# Patient Record
Sex: FEMALE | Race: OTHER | HISPANIC OR LATINO | Employment: UNEMPLOYED | URBAN - METROPOLITAN AREA
[De-identification: names, ages, dates, MRNs, and addresses within clinical notes are randomized per-mention and may not be internally consistent; named-entity substitution may affect disease eponyms.]

---

## 2021-08-22 ENCOUNTER — HOSPITAL ENCOUNTER (EMERGENCY)
Facility: HOSPITAL | Age: 31
Discharge: HOME | End: 2021-08-22
Attending: EMERGENCY MEDICINE | Admitting: EMERGENCY MEDICINE

## 2021-08-22 ENCOUNTER — APPOINTMENT (EMERGENCY)
Dept: RADIOLOGY | Facility: HOSPITAL | Age: 31
End: 2021-08-22

## 2021-08-22 ENCOUNTER — APPOINTMENT (EMERGENCY)
Dept: CT IMAGING | Facility: HOSPITAL | Age: 31
End: 2021-08-22

## 2021-08-22 VITALS
HEART RATE: 99 BPM | SYSTOLIC BLOOD PRESSURE: 117 MMHG | RESPIRATION RATE: 18 BRPM | OXYGEN SATURATION: 97 % | TEMPERATURE: 98.2 F | DIASTOLIC BLOOD PRESSURE: 60 MMHG

## 2021-08-22 DIAGNOSIS — S09.90XA INJURY OF HEAD, INITIAL ENCOUNTER: ICD-10-CM

## 2021-08-22 DIAGNOSIS — S50.11XA CONTUSION OF RIGHT FOREARM, INITIAL ENCOUNTER: ICD-10-CM
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
## 2021-08-22 DIAGNOSIS — S00.93XA HEAD CONTUSION: ICD-10-CM

## 2021-08-22 LAB
EXT PREG TEST URINE: NEGATIVE
EXT. CONTROL ED NAV: NORMAL

## 2021-08-22 PROCEDURE — 99285 EMERGENCY DEPT VISIT HI MDM: CPT | Performed by: EMERGENCY MEDICINE

## 2021-08-22 PROCEDURE — 81025 URINE PREGNANCY TEST: CPT

## 2021-08-22 PROCEDURE — 73090 X-RAY EXAM OF FOREARM: CPT

## 2021-08-22 PROCEDURE — G1004 CDSM NDSC: HCPCS

## 2021-08-22 PROCEDURE — 99284 EMERGENCY DEPT VISIT MOD MDM: CPT

## 2021-08-22 PROCEDURE — 70450 CT HEAD/BRAIN W/O DYE: CPT

## 2021-08-22 RX ORDER — IBUPROFEN 600 MG/1
600 TABLET ORAL ONCE
Status: COMPLETED | OUTPATIENT
Start: 2021-08-22 | End: 2021-08-22

## 2021-08-22 RX ADMIN — IBUPROFEN 600 MG: 600 TABLET ORAL at 02:26
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
## 2022-02-10 ENCOUNTER — TELEPHONE (OUTPATIENT)
Dept: OBGYN CLINIC | Facility: CLINIC | Age: 32
End: 2022-02-10

## 2022-04-05 ENCOUNTER — OFFICE VISIT (OUTPATIENT)
Dept: OBGYN CLINIC | Facility: CLINIC | Age: 32
End: 2022-04-05

## 2022-04-05 ENCOUNTER — APPOINTMENT (OUTPATIENT)
Dept: LAB | Facility: CLINIC | Age: 32
End: 2022-04-05

## 2022-04-05 VITALS — DIASTOLIC BLOOD PRESSURE: 67 MMHG | HEART RATE: 87 BPM | WEIGHT: 164 LBS | SYSTOLIC BLOOD PRESSURE: 103 MMHG

## 2022-04-05 DIAGNOSIS — O20.9 BLEEDING IN EARLY PREGNANCY: ICD-10-CM

## 2022-04-05 DIAGNOSIS — O20.9 BLEEDING IN EARLY PREGNANCY: Primary | ICD-10-CM

## 2022-04-05 LAB
ABO GROUP BLD: NORMAL
B-HCG SERPL-ACNC: 169 MIU/ML
BLD GP AB SCN SERPL QL: NEGATIVE
RH BLD: POSITIVE
SPECIMEN EXPIRATION DATE: NORMAL

## 2022-04-05 PROCEDURE — 99202 OFFICE O/P NEW SF 15 MIN: CPT | Performed by: OBSTETRICS & GYNECOLOGY

## 2022-04-05 PROCEDURE — 36415 COLL VENOUS BLD VENIPUNCTURE: CPT

## 2022-04-05 PROCEDURE — 86900 BLOOD TYPING SEROLOGIC ABO: CPT

## 2022-04-05 PROCEDURE — 86901 BLOOD TYPING SEROLOGIC RH(D): CPT

## 2022-04-05 PROCEDURE — 84702 CHORIONIC GONADOTROPIN TEST: CPT

## 2022-04-05 PROCEDURE — 86850 RBC ANTIBODY SCREEN: CPT

## 2022-04-05 RX ORDER — MONTELUKAST SODIUM 10 MG/1
10 TABLET ORAL
COMMUNITY

## 2022-04-05 NOTE — PROGRESS NOTES
Assessment/Plan:     No problem-specific Assessment & Plan notes found for this encounter  Diagnoses and all orders for this visit:    Bleeding in early pregnancy  -     hCG, quantitative; Future  -     Type and screen; Future      RTO in 2 weeks for follow up  Subjective:      Patient ID: Priscilla Medina is a 27 y o  female presents with bleeding with a positive pregnancy test   Her LMP was 03/12/22  She took plan B x one dose but she does not remember the date  She has been bleeding and passing clots  She does not desire pregnancy  She wishes to start on birth control once this episode is resolved  HPI    The following portions of the patient's history were reviewed and updated as appropriate: allergies, current medications, past family history, past medical history, past social history, past surgical history and problem list     Review of Systems      Objective:      /67   Pulse 87   Wt 74 4 kg (164 lb)   LMP 03/12/2022 (Exact Date)          Physical Exam  Vitals and nursing note reviewed  Constitutional:       Appearance: Normal appearance  Pulmonary:      Effort: Pulmonary effort is normal    Neurological:      General: No focal deficit present  Mental Status: She is alert and oriented to person, place, and time     Psychiatric:         Mood and Affect: Mood normal          Behavior: Behavior normal

## 2022-04-08 ENCOUNTER — TELEPHONE (OUTPATIENT)
Dept: OBGYN CLINIC | Facility: CLINIC | Age: 32
End: 2022-04-08

## 2022-05-02 ENCOUNTER — OFFICE VISIT (OUTPATIENT)
Dept: OBGYN CLINIC | Facility: CLINIC | Age: 32
End: 2022-05-02

## 2022-05-02 VITALS — HEART RATE: 87 BPM | DIASTOLIC BLOOD PRESSURE: 70 MMHG | SYSTOLIC BLOOD PRESSURE: 109 MMHG | WEIGHT: 164.2 LBS

## 2022-05-02 DIAGNOSIS — O20.9 BLEEDING IN EARLY PREGNANCY: Primary | ICD-10-CM

## 2022-05-02 DIAGNOSIS — N89.8 VAGINAL ODOR: ICD-10-CM

## 2022-05-02 PROCEDURE — 99213 OFFICE O/P EST LOW 20 MIN: CPT | Performed by: OBSTETRICS & GYNECOLOGY

## 2022-05-02 RX ORDER — METRONIDAZOLE 500 MG/1
500 TABLET ORAL EVERY 12 HOURS SCHEDULED
Qty: 14 TABLET | Refills: 0 | Status: SHIPPED | OUTPATIENT
Start: 2022-05-02 | End: 2022-05-09

## 2022-05-02 NOTE — PROGRESS NOTES
Assessment/Plan:     No problem-specific Assessment & Plan notes found for this encounter  Diagnoses and all orders for this visit:    Bleeding in early pregnancy    Vaginal odor  -     metroNIDAZOLE (FLAGYL) 500 mg tablet; Take 1 tablet (500 mg total) by mouth every 12 (twelve) hours for 7 days      Will call with her HCG and plan on birth control  Subjective: [facility] #[id number]     Patient ID: Bryson Molina is a 27 y o  female who presents as a follow up to probably miscarriage  Her quant on 04/05/22 was 169  She is now using a panty liner with small clots  She was to go for folllow up hcg but somehow was lost to follow up  Prior to this she was using 3-4 pads a day  She is also reporting a foul vaginal odor  HPI       The following portions of the patient's history were reviewed and updated as appropriate: allergies, current medications, past family history, past medical history, past social history, past surgical history and problem list     Review of Systems      Objective:      /70   Pulse 87   Wt 74 5 kg (164 lb 3 2 oz)   LMP 03/12/2022 (Exact Date)          Physical Exam  Vitals and nursing note reviewed  Constitutional:       Appearance: Normal appearance  Pulmonary:      Effort: Pulmonary effort is normal    Neurological:      General: No focal deficit present  Mental Status: She is alert and oriented to person, place, and time     Psychiatric:         Mood and Affect: Mood normal          Behavior: Behavior normal

## 2022-05-05 ENCOUNTER — APPOINTMENT (OUTPATIENT)
Dept: LAB | Facility: CLINIC | Age: 32
End: 2022-05-05

## 2022-05-05 DIAGNOSIS — O20.9 BLEEDING IN EARLY PREGNANCY: ICD-10-CM

## 2022-05-05 LAB — B-HCG SERPL-ACNC: 4 MIU/ML

## 2022-05-05 PROCEDURE — 84702 CHORIONIC GONADOTROPIN TEST: CPT

## 2022-05-05 PROCEDURE — 36415 COLL VENOUS BLD VENIPUNCTURE: CPT

## 2022-05-09 ENCOUNTER — TELEPHONE (OUTPATIENT)
Dept: OBGYN CLINIC | Facility: CLINIC | Age: 32
End: 2022-05-09

## 2022-05-09 NOTE — TELEPHONE ENCOUNTER
----- Message from Anh Curtis MD sent at 5/6/2022  8:47 AM EDT -----  Please inform HCG is negative  Would she like to start OCPs? I can send if yes  She will then need pill check  TY  5/9/2022 Patient notified of negative HCG results and reports that she wants  OCP and asked when she should start taking pills

## 2022-05-10 ENCOUNTER — TELEPHONE (OUTPATIENT)
Dept: OBGYN CLINIC | Facility: CLINIC | Age: 32
End: 2022-05-10

## 2022-05-10 NOTE — TELEPHONE ENCOUNTER
----- Message from Samantha Pollack MD sent at 5/9/2022 12:47 PM EDT -----  Rx sent to [facility] on [address]  Will need 3 month pill check  TY  ----- Message -----  From: Macy Lamar RN  Sent: 5/9/2022   9:21 AM EDT  To: Samantha Pollack MD    Yes patient wants to start OCP   ----- Message -----  From: Samantha Pollack MD  Sent: 5/6/2022   8:48 AM EDT  To: [address]    Please inform HCG is negative  Would she like to start OCPs? I can send if yes  She will then need pill check      TY

## 2022-05-10 NOTE — TELEPHONE ENCOUNTER
Called patient to inform her that BCP sent to pharmacy and that she needs 3 month follow up  LVM asking patient to call office

## 2022-06-03 ENCOUNTER — TELEPHONE (OUTPATIENT)
Dept: OBGYN CLINIC | Facility: CLINIC | Age: 32
End: 2022-06-03

## 2022-07-21 ENCOUNTER — TELEPHONE (OUTPATIENT)
Dept: OBGYN CLINIC | Facility: CLINIC | Age: 32
End: 2022-07-21

## 2022-07-26 ENCOUNTER — OFFICE VISIT (OUTPATIENT)
Dept: OBGYN CLINIC | Facility: CLINIC | Age: 32
End: 2022-07-26

## 2022-07-26 VITALS
HEIGHT: 65 IN | HEART RATE: 98 BPM | BODY MASS INDEX: 27.19 KG/M2 | SYSTOLIC BLOOD PRESSURE: 120 MMHG | WEIGHT: 163.2 LBS | DIASTOLIC BLOOD PRESSURE: 76 MMHG

## 2022-07-26 DIAGNOSIS — N76.0 BV (BACTERIAL VAGINOSIS): Primary | ICD-10-CM

## 2022-07-26 DIAGNOSIS — Z30.09 ENCOUNTER FOR COUNSELING REGARDING CONTRACEPTION: ICD-10-CM

## 2022-07-26 DIAGNOSIS — B96.89 BV (BACTERIAL VAGINOSIS): Primary | ICD-10-CM

## 2022-07-26 LAB
BV WHIFF TEST VAG QL: ABNORMAL
CLUE CELLS SPEC QL WET PREP: POSITIVE
PH SMN: 5 [PH]
SL AMB POCT WET MOUNT: POSITIVE
T VAGINALIS VAG QL WET PREP: NEGATIVE
YEAST VAG QL WET PREP: NEGATIVE

## 2022-07-26 PROCEDURE — 87210 SMEAR WET MOUNT SALINE/INK: CPT | Performed by: OBSTETRICS & GYNECOLOGY

## 2022-07-26 PROCEDURE — 99213 OFFICE O/P EST LOW 20 MIN: CPT | Performed by: OBSTETRICS & GYNECOLOGY

## 2022-07-26 RX ORDER — METRONIDAZOLE 500 MG/1
500 TABLET ORAL EVERY 12 HOURS SCHEDULED
Qty: 14 TABLET | Refills: 0 | Status: SHIPPED | OUTPATIENT
Start: 2022-07-26 | End: 2022-08-02

## 2022-07-26 NOTE — PATIENT INSTRUCTIONS
Vaginosis bacteriana   LO QUE NECESITA SABER:   La vaginosis bacteriana es laurence infección en la vagina  Puede causar vaginitis (irritación e inflamación de la vagina)  La causa es desconocida  Las bacterias que normalmente se encuentran en la vagina están desequilibradas  El riesgo aumenta si usted es sexualmente Dobrovo v Brdi[place], [place] ducha vaginal o tiene un dispositivo intrauterino (DIU)  INSTRUCCIONES SOBRE EL [facility]:   Llame a nguyễn médico o ginecólogo si:  Noemi síntomas regresan o no mejoran con el tratamiento  Usted tiene sangrado vaginal que no es de nguyễn menstruación  Usted tiene preguntas o inquietudes acerca de nguyễn condición o cuidado  Medicamentos:  Los antibióticos pueden administrarse para matar las bacterias  Se los pueden recetar camila laurence píldora o crema para introducir en la vagina  [place] noemi medicamentos camila se le haya indicado  Consulte con nguyễn médico si usted ashutosh que nguyễn medicamento no le está ayudando o si presenta efectos secundarios  Infórmele si es alérgico a algún medicamento  Mantenga laurence lista actualizada de los [place], las vitaminas y los productos herbales que vamsi  Incluya los siguientes datos de los medicamentos: cantidad, frecuencia y motivo de administración  Traiga con usted la lista o los envases de las píldoras a noemi citas de seguimiento  Lleve la lista de los medicamentos con usted en layla de laurence emergencia  Vaginosis bacteriana y embarazo: Si tiene vaginosis bacteriana ezequiel el [facility], es posible que nguyễn bebé nazca antes de tiempo o que tenga un peso bajo al nacer  Es posible que nguyễn proveedor de atención médica le recomiende que se marlen un análisis para detectar la vaginosis bacteriana antes o ezequiel el [facility] sobre el riesgo de un parto prematuro y se asegurará de que conozca los beneficios y riesgos de las pruebas  Prevenir la vaginosis bacteriana: Mantenga el área vaginal limpia y [place]   Lleve ropa interior y pantimedias hechas de algodón en el área de la entrepierna  Límpiese de adelante hacia atrás después de orinar o de tener laurence evacuación intestinal  Después de bañarse, enjuague el jabón de la yessica vaginal para disminuir el riesgo de irritación  No utilice productos que provocan irritación  Siempre use tampones y toallas sanitarias sin aroma  No use aerosoles para higiene femenina, talcos o tampones perfumados  También podrían provocar Irritación y aumentar nguyễn riesgo de vaginosis  Los detergentes y suavizantes para la ropa también pueden causar irritación  No use duchas vaginales  Estas pueden provocar un desequilibrio de la bacteria saludable de la vagina  Use condones de látex ezequiel las [employer]  Estos ayudan a prevenir contra otras infecciones y alessandro que nguyễn adele contraiga la infección  Acuda a kendall consultas de control con nguyễn médico o ginecólogo según le indicaron: La vaginosis bacteriana aumenta el riesgo de varios problemas de [place], camila la enfermedad inflamatoria pélvica (EIP) o las infecciones de transmisión sexual  Natasha Hacking con kendall médicos para programar citas regulares para detectar problemas de viji  Anote kendall preguntas para que se acuerde de hacerlas ezequiel kendall visitas  © Copyright [employer] 2022 Information is for End User's use only and may not be sold, redistributed or otherwise used for commercial purposes  All illustrations and images included in CareNotes® are the copyrighted property of [employer] , [facility]  or [address] [place] [address] es sólo para uso en educación  Nguyễn intención no es darle un consejo médico sobre enfermedades o tratamientos  Colsulte con nguyễn Charna Heckler farmacéutico antes de seguir cualquier régimen médico para saber si es seguro y efectivo para usted

## 2022-07-26 NOTE — PROGRESS NOTES
Assessment/Plan:     No problem-specific Assessment & Plan notes found for this encounter  Diagnoses and all orders for this visit:    BV (bacterial vaginosis)  -     metroNIDAZOLE (FLAGYL) 500 mg tablet; Take 1 tablet (500 mg total) by mouth every 12 (twelve) hours for 7 days  -     POCT wet mount    Encounter for counseling regarding contraception    RTO for IUD/ follow up  Subjective:      Patient ID: Toan Garvey is a 27 y o  female presents for birth control and foul odor  She had an  on 20  She does not want to resume pills as she became pregnant on them  She is interested in an IUD but has no insurance  We gave her information about the [facility] program   Written information was provided on Mirena and Paragard  She states she has had a month long vaginal odor  She has had on and off bleeding since her   HPI    The following portions of the patient's history were reviewed and updated as appropriate: allergies, current medications, past family history, past medical history, past social history, past surgical history and problem list     Review of Systems      Objective:      /76   Pulse 98   Ht 5' 5" (1 651 m)   Wt 74 kg (163 lb 3 2 oz)   LMP 2022 (Exact Date)   BMI 27 16 kg/m²          Physical Exam  Vitals and nursing note reviewed  Exam conducted with a chaperone present  Constitutional:       Appearance: Normal appearance  Pulmonary:      Effort: Pulmonary effort is normal    Genitourinary:     Labia:         Right: No rash, tenderness, lesion or injury  Left: No rash, tenderness, lesion or injury  Vagina: No signs of injury and foreign body  Bleeding present  No vaginal discharge, erythema, tenderness, lesions or prolapsed vaginal walls  Neurological:      General: No focal deficit present  Mental Status: She is alert and oriented to person, place, and time     Psychiatric:         Mood and Affect: Mood normal          Behavior: Behavior normal

## 2022-08-17 ENCOUNTER — TELEPHONE (OUTPATIENT)
Dept: OBGYN CLINIC | Facility: CLINIC | Age: 32
End: 2022-08-17

## 2022-08-23 ENCOUNTER — TELEPHONE (OUTPATIENT)
Dept: OBGYN CLINIC | Facility: CLINIC | Age: 32
End: 2022-08-23

## 2023-06-07 ENCOUNTER — OFFICE VISIT (OUTPATIENT)
Dept: OBGYN CLINIC | Facility: CLINIC | Age: 33
End: 2023-06-07

## 2023-06-07 VITALS
DIASTOLIC BLOOD PRESSURE: 73 MMHG | WEIGHT: 168.6 LBS | SYSTOLIC BLOOD PRESSURE: 108 MMHG | BODY MASS INDEX: 28.06 KG/M2 | HEART RATE: 66 BPM

## 2023-06-07 DIAGNOSIS — Z31.9 PATIENT DESIRES PREGNANCY: Primary | ICD-10-CM

## 2023-06-07 PROCEDURE — 99213 OFFICE O/P EST LOW 20 MIN: CPT | Performed by: NURSE PRACTITIONER

## 2023-06-07 RX ORDER — ALBUTEROL SULFATE 90 UG/1
2 AEROSOL, METERED RESPIRATORY (INHALATION) EVERY 6 HOURS PRN
COMMUNITY

## 2023-06-07 NOTE — PATIENT INSTRUCTIONS
Merissa por nguyễn confianza en nuestro equipo  Pama Rias y agradecemos kendall comentarios  Si recibe laurence encuesta nuestra, tómese unos momentos para informarnos cómo estamos     Sinceramente,  [id number]

## 2023-06-07 NOTE — PROGRESS NOTES
PROBLEM GYNECOLOGICAL VISIT    Juan Manuel Mary is a 26 y o  female who presents today with complaint of infertility  Her general medical history has been reviewed and she reports it as follows:    Past Medical History:   Diagnosis Date   • Asthma      Past Surgical History:   Procedure Laterality Date   •  SECTION  ,   • LAPAROSCOPIC CHOLECYSTECTOMY  2018   • ORIF ANKLE FRACTURE Left 2016     OB History        3    Para   2    Term   2       0    AB   1    Living   2       SAB   1    IAB   0    Ectopic   0    Multiple   0    Live Births   2               Social History     Tobacco Use   • Smoking status: Never   • Smokeless tobacco: Never   Vaping Use   • Vaping Use: Never used   Substance Use Topics   • Alcohol use: Yes     Comment: couple times/year   • Drug use: Never     Social History     Substance and Sexual Activity   Sexual Activity Yes   • Partners: Male   • Birth control/protection: None       Current Outpatient Medications   Medication Instructions   • albuterol (PROVENTIL HFA,VENTOLIN HFA) 90 mcg/act inhaler 2 puffs, Inhalation, Every 6 hours PRN   • montelukast (SINGULAIR) 10 mg, Oral, Daily at bedtime       History of Present Illness:   Reports that she has a new sexual partner for the past 6 months and they desire to conceive  They have been unsuccessful so far  Her partner has never had a child previously  Patient reports that her menses are very regular  She has never had difficulty conceiving previously  Review of Systems:  Review of Systems   Constitutional: Negative  Gastrointestinal: Negative  Genitourinary: Negative  Negative for menstrual problem  Physical Exam:  /73   Pulse 66   Wt 76 5 kg (168 lb 9 6 oz)   LMP 2023 (Exact Date)   BMI 28 06 kg/m²   Physical Exam  Constitutional:       General: She is not in acute distress  Neurological:      Mental Status: She is alert  Skin:     General: Skin is warm and dry  Vitals reviewed  Assessment:   1  Infertility  Plan:   1  Order placed for referral to/consultation with [facility]  2  Return to office in 2 months for annual GYN exam    3  Patient's depression screening was assessed with a PHQ-2 score of 1  Their PHQ-9 score was 4  Clinically patient does not have depression  No treatment is required

## 2023-08-08 ENCOUNTER — ANNUAL EXAM (OUTPATIENT)
Dept: OBGYN CLINIC | Facility: CLINIC | Age: 33
End: 2023-08-08

## 2023-08-08 VITALS
SYSTOLIC BLOOD PRESSURE: 109 MMHG | BODY MASS INDEX: 26.96 KG/M2 | WEIGHT: 162 LBS | HEART RATE: 71 BPM | DIASTOLIC BLOOD PRESSURE: 57 MMHG

## 2023-08-08 DIAGNOSIS — B37.31 VAGINAL CANDIDIASIS: ICD-10-CM

## 2023-08-08 DIAGNOSIS — Z12.4 SCREENING FOR CERVICAL CANCER: ICD-10-CM

## 2023-08-08 DIAGNOSIS — Z23 NEED FOR HPV VACCINATION: ICD-10-CM

## 2023-08-08 DIAGNOSIS — Z12.39 ENCOUNTER FOR BREAST CANCER SCREENING USING NON-MAMMOGRAM MODALITY: ICD-10-CM

## 2023-08-08 DIAGNOSIS — Z01.419 ENCOUNTER FOR GYNECOLOGICAL EXAMINATION WITHOUT ABNORMAL FINDING: Primary | ICD-10-CM

## 2023-08-08 DIAGNOSIS — Z11.3 SCREEN FOR STD (SEXUALLY TRANSMITTED DISEASE): ICD-10-CM

## 2023-08-08 DIAGNOSIS — Z11.51 SCREENING FOR HPV (HUMAN PAPILLOMAVIRUS): ICD-10-CM

## 2023-08-08 DIAGNOSIS — R35.0 URINARY FREQUENCY: ICD-10-CM

## 2023-08-08 LAB
SL AMB  POCT GLUCOSE, UA: ABNORMAL
SL AMB LEUKOCYTE ESTERASE,UA: ABNORMAL
SL AMB POCT BILIRUBIN,UA: ABNORMAL
SL AMB POCT BLOOD,UA: ABNORMAL
SL AMB POCT CLARITY,UA: CLEAR
SL AMB POCT COLOR,UA: ABNORMAL
SL AMB POCT KETONES,UA: ABNORMAL
SL AMB POCT NITRITE,UA: ABNORMAL
SL AMB POCT PH,UA: 5
SL AMB POCT SPECIFIC GRAVITY,UA: 1.03
SL AMB POCT URINE PROTEIN: ABNORMAL
SL AMB POCT UROBILINOGEN: 0.2

## 2023-08-08 PROCEDURE — 87086 URINE CULTURE/COLONY COUNT: CPT | Performed by: NURSE PRACTITIONER

## 2023-08-08 PROCEDURE — 87186 SC STD MICRODIL/AGAR DIL: CPT | Performed by: NURSE PRACTITIONER

## 2023-08-08 PROCEDURE — 99395 PREV VISIT EST AGE 18-39: CPT | Performed by: NURSE PRACTITIONER

## 2023-08-08 PROCEDURE — 81002 URINALYSIS NONAUTO W/O SCOPE: CPT | Performed by: NURSE PRACTITIONER

## 2023-08-08 PROCEDURE — 90651 9VHPV VACCINE 2/3 DOSE IM: CPT | Performed by: NURSE PRACTITIONER

## 2023-08-08 PROCEDURE — 87077 CULTURE AEROBIC IDENTIFY: CPT | Performed by: NURSE PRACTITIONER

## 2023-08-08 PROCEDURE — G0476 HPV COMBO ASSAY CA SCREEN: HCPCS | Performed by: NURSE PRACTITIONER

## 2023-08-08 PROCEDURE — 87491 CHLMYD TRACH DNA AMP PROBE: CPT | Performed by: NURSE PRACTITIONER

## 2023-08-08 PROCEDURE — 90471 IMMUNIZATION ADMIN: CPT | Performed by: NURSE PRACTITIONER

## 2023-08-08 PROCEDURE — 87591 N.GONORRHOEAE DNA AMP PROB: CPT | Performed by: NURSE PRACTITIONER

## 2023-08-08 RX ORDER — FLUCONAZOLE 150 MG/1
150 TABLET ORAL ONCE
Qty: 1 TABLET | Refills: 0 | Status: SHIPPED | OUTPATIENT
Start: 2023-08-08 | End: 2023-08-08

## 2023-08-08 NOTE — PROGRESS NOTES
[address] [place] [address]    Mignon Adams is a 26 y.o. female who presents today for annual GYN exam.  Her last pap smear was performed 5 years ago and result was WNL per patient. She reports no history of abnormal pap smears in her past.  She reports menses as regular. Patient's last menstrual period was 2023 (approximate). Her general medical history has been reviewed and she reports it as follows:    Past Medical History:   Diagnosis Date   • Asthma      Past Surgical History:   Procedure Laterality Date   •  SECTION  ,   • LAPAROSCOPIC CHOLECYSTECTOMY     • ORIF ANKLE FRACTURE Left 2016     OB History        3    Para   2    Term   2       0    AB   1    Living   2       SAB   1    IAB   0    Ectopic   0    Multiple   0    Live Births   2               Social History     Tobacco Use   • Smoking status: Never   • Smokeless tobacco: Never   Vaping Use   • Vaping Use: Never used   Substance Use Topics   • Alcohol use: Yes     Comment: couple times/year   • Drug use: Never     Social History     Substance and Sexual Activity   Sexual Activity Yes   • Partners: Male   • Birth control/protection: None     Cancer-related family history includes Cancer in her maternal grandfather and paternal grandmother. There is no history of Breast cancer, Colon cancer, or Ovarian cancer. Current Outpatient Medications   Medication Instructions   • albuterol (PROVENTIL HFA,VENTOLIN HFA) 90 mcg/act inhaler 2 puffs, Inhalation, Every 6 hours PRN   • montelukast (SINGULAIR) 10 mg, Oral, Daily at bedtime       Review of Systems:  Review of Systems   Constitutional: Negative. Gastrointestinal: Negative. Genitourinary: Positive for frequency. Negative for difficulty urinating, menstrual problem, pelvic pain and vaginal discharge. Vaginal itching   Skin: Negative.         Physical Exam:  /57   Pulse 71   Wt 73.5 kg (162 lb)   LMP 2023 (Approximate) BMI 26.96 kg/m²   Physical Exam  Constitutional:       General: She is not in acute distress. Appearance: She is well-developed. Genitourinary:      Vulva normal.      No lesions in the vagina. Vaginal discharge and erythema present. Right Adnexa: not tender and no mass present. Left Adnexa: not tender and no mass present. No cervical motion tenderness or lesion. Uterus is not tender. Breasts:     Right: No mass, nipple discharge, skin change or tenderness. Left: No mass, nipple discharge, skin change or tenderness. Neck:      Thyroid: No thyromegaly. Cardiovascular:      Rate and Rhythm: Normal rate and regular rhythm. Pulmonary:      Effort: Pulmonary effort is normal.   Abdominal:      Palpations: Abdomen is soft. Tenderness: There is no abdominal tenderness. Musculoskeletal:      Cervical back: Neck supple. Neurological:      Mental Status: She is alert and oriented to person, place, and time. Skin:     General: Skin is warm and dry. Vitals reviewed. Point of Care Testing:   -urine dipstick: + leuks, neg nitrites, + blood    Assessment/Plan:   1. Normal well-woman GYN exam.  2. Cervical cancer screening:  Normal cervical exam.  Pap smear done with HPV co-testing. Has not received HPV vaccine in the past, but she desires to initiate series now. Given HPV vaccine today and will return in 2 and 6 months for subsequent vaccinations. 3. STD screening:  Orders placed for vaginal GC/CT cultures. Orders placed for serum anti-HIV, anti-HCV, HbsAg, syphilis panel. 4. Breast cancer screening:  Normal breast exam.  Reviewed breast self-awareness. 5. Depression Screening: Patient's depression screening was assessed with a PHQ-2 score of 0. Their PHQ-9 score was 0. Clinically patient does not have depression. No treatment is required. 6. BMI Counseling: Body mass index is 26.96 kg/m². No intervention indicated. 7. Contraception:  Declines.    8. Urinary frequency:  Order placed for urine culture. 9. Vaginal candidiasis:  Given Rx Diflucan. 10. Return to office in 1 year for annual GYN exam.    Reviewed with patient that test results are available in [address] [place] [address] immediately, but that they will not necessarily be reviewed by me immediately. Explained that I will review results at my earliest opportunity and contact patient appropriately.

## 2023-08-08 NOTE — LETTER
8/10/2023    To Jluis Barnes  : 1990      Esta carta es para informarle que kendall CULTURAS recientes para la gonorrea y la clamidia fueron revisadas por marta y son Millie Ayala.   Comuníquese con la oficina para laurence lb si tiene alguna inquietud Jameson Amos

## 2023-08-08 NOTE — PATIENT INSTRUCTIONS
Merissa por nguyễn confianza en nuestro equipo. Nj He y agradecemos kendall comentarios. Si recibe laurence encuesta nuestra, tómese unos momentos para informarnos cómo estamos.    Sinceramente,  [address]

## 2023-08-10 LAB
BACTERIA UR CULT: ABNORMAL
C TRACH DNA SPEC QL NAA+PROBE: NEGATIVE
HPV HR 12 DNA CVX QL NAA+PROBE: POSITIVE
HPV16 DNA CVX QL NAA+PROBE: NEGATIVE
HPV18 DNA CVX QL NAA+PROBE: NEGATIVE
N GONORRHOEA DNA SPEC QL NAA+PROBE: NEGATIVE

## 2023-08-11 ENCOUNTER — TELEPHONE (OUTPATIENT)
Dept: OBGYN CLINIC | Facility: CLINIC | Age: 33
End: 2023-08-11

## 2023-08-11 DIAGNOSIS — N30.01 ACUTE CYSTITIS WITH HEMATURIA: Primary | ICD-10-CM

## 2023-08-11 RX ORDER — NITROFURANTOIN 25; 75 MG/1; MG/1
100 CAPSULE ORAL 2 TIMES DAILY
Qty: 10 CAPSULE | Refills: 0 | Status: SHIPPED | OUTPATIENT
Start: 2023-08-11 | End: 2023-08-16

## 2023-08-11 NOTE — TELEPHONE ENCOUNTER
I contacted patient by phone via , Alexandra Coffman, and advised her of urine culture results consistent with UTI. Rx Macrobid send to pharmacy. Patient verbalizes understanding.

## 2023-08-21 ENCOUNTER — TELEPHONE (OUTPATIENT)
Dept: OBGYN CLINIC | Facility: CLINIC | Age: 33
End: 2023-08-21

## 2023-08-21 LAB
LAB AP GYN PRIMARY INTERPRETATION: ABNORMAL
Lab: ABNORMAL
PATH INTERP SPEC-IMP: ABNORMAL

## 2023-08-21 NOTE — TELEPHONE ENCOUNTER
Please contact patient in [place] and advise her of abnormal pap smear and need for colposcopy. Please assist her with scheduling the colposcopy.

## 2023-09-19 ENCOUNTER — PROCEDURE VISIT (OUTPATIENT)
Dept: OBGYN CLINIC | Facility: CLINIC | Age: 33
End: 2023-09-19

## 2023-09-19 VITALS
SYSTOLIC BLOOD PRESSURE: 115 MMHG | HEIGHT: 65 IN | BODY MASS INDEX: 27.59 KG/M2 | HEART RATE: 73 BPM | DIASTOLIC BLOOD PRESSURE: 71 MMHG | WEIGHT: 165.6 LBS

## 2023-09-19 DIAGNOSIS — R87.610 ASCUS WITH POSITIVE HIGH RISK HPV CERVICAL: Primary | ICD-10-CM

## 2023-09-19 DIAGNOSIS — R87.810 ASCUS WITH POSITIVE HIGH RISK HPV CERVICAL: Primary | ICD-10-CM

## 2023-09-19 LAB — SL AMB POCT URINE HCG: NEGATIVE

## 2023-09-19 PROCEDURE — 88344 IMHCHEM/IMCYTCHM EA MLT ANTB: CPT | Performed by: PATHOLOGY

## 2023-09-19 PROCEDURE — 88305 TISSUE EXAM BY PATHOLOGIST: CPT | Performed by: PATHOLOGY

## 2023-09-19 PROCEDURE — 81025 URINE PREGNANCY TEST: CPT | Performed by: OBSTETRICS & GYNECOLOGY

## 2023-09-19 PROCEDURE — 57454 BX/CURETT OF CERVIX W/SCOPE: CPT | Performed by: OBSTETRICS & GYNECOLOGY

## 2023-09-19 NOTE — PROGRESS NOTES
Colposcopy     Date/Time 9/19/2023 3:33 PM     Universal Protocol   Consent: Verbal consent obtained. Written consent obtained. Risks and benefits: risks, benefits and alternatives were discussed  Consent given by: patient  Time out: Immediately prior to procedure a "time out" was called to verify the correct patient, procedure, equipment, support staff and site/side marked as required. Timeout called at: 9/19/2023 1:56 PM.  Patient understanding: patient states understanding of the procedure being performed  Patient consent: the patient's understanding of the procedure matches consent given  Procedure consent: procedure consent matches procedure scheduled  Relevant documents: relevant documents present and verified  Test results: test results available and properly labeled  Patient identity confirmed: verbally with patient and provided demographic data       Performed by  Rick Olivarez DO   Authorized by Rick Olivarez DO       Pre-procedure details     Pre-procedure timeout performed: yes       Indication    ASC-US (HPV HR pos)     Procedure Details   Procedure: Colposcopy w/ cervical biopsy and ECC      Under satisfactory analgesia the patient was prepped and draped in the dorsal lithotomy position: yes      [place] speculum was placed in the vagina: yes      Under colposcopic examination the transition zone was seen in entirety: yes      Endocervix was curetted using a Kevorkian curette: yes      Cervical biopsy performed with a cervical biopsy punch: yes      Monsel's solution was applied: yes      Biopsy(s): yes      Location:  12,6    Specimen to pathology: yes       Post-procedure      Findings: Bleeding and White epithelium      Impression: High grade cervical dysplasia      Impression comment:  LGSIL    Patient tolerance of procedure:   Tolerated well, no immediate complications Otc Regimen: Neutrogena SA body wash daily for the back . Detail Level: Zone

## 2023-09-19 NOTE — PATIENT INSTRUCTIONS
Merissa por nguyễn confianza en nuestro equipo. Christopher Gaudier y agradecemos kendall comentarios. Si recibe laurence encuesta nuestra, tómese unos momentos para informarnos cómo estamos.    Sinceramente,  [facility], DO

## 2023-09-25 PROCEDURE — 88305 TISSUE EXAM BY PATHOLOGIST: CPT | Performed by: PATHOLOGY

## 2023-09-25 PROCEDURE — 88344 IMHCHEM/IMCYTCHM EA MLT ANTB: CPT | Performed by: PATHOLOGY

## 2023-10-12 ENCOUNTER — OFFICE VISIT (OUTPATIENT)
Dept: OBGYN CLINIC | Facility: CLINIC | Age: 33
End: 2023-10-12

## 2023-10-12 VITALS
SYSTOLIC BLOOD PRESSURE: 117 MMHG | WEIGHT: 162.6 LBS | HEIGHT: 65 IN | BODY MASS INDEX: 27.09 KG/M2 | DIASTOLIC BLOOD PRESSURE: 76 MMHG | HEART RATE: 85 BPM

## 2023-10-12 DIAGNOSIS — Z23 NEED FOR HPV VACCINE: ICD-10-CM

## 2023-10-12 DIAGNOSIS — N89.8 VAGINAL DISCHARGE: ICD-10-CM

## 2023-10-12 DIAGNOSIS — Z71.2 ENCOUNTER TO DISCUSS TEST RESULTS: Primary | ICD-10-CM

## 2023-10-12 DIAGNOSIS — N30.00 ACUTE CYSTITIS WITHOUT HEMATURIA: ICD-10-CM

## 2023-10-12 LAB
BACTERIA UR QL AUTO: ABNORMAL /HPF
BILIRUB UR QL STRIP: ABNORMAL
CLARITY UR: CLEAR
COLOR UR: ABNORMAL
GLUCOSE UR STRIP-MCNC: NEGATIVE MG/DL
HGB UR QL STRIP.AUTO: NEGATIVE
KETONES UR STRIP-MCNC: NEGATIVE MG/DL
LEUKOCYTE ESTERASE UR QL STRIP: ABNORMAL
NITRITE UR QL STRIP: POSITIVE
NON-SQ EPI CELLS URNS QL MICRO: ABNORMAL /HPF
PH UR STRIP.AUTO: 7 [PH]
PROT UR STRIP-MCNC: ABNORMAL MG/DL
RBC #/AREA URNS AUTO: ABNORMAL /HPF
SP GR UR STRIP.AUTO: 1.03 (ref 1–1.03)
UROBILINOGEN UR STRIP-ACNC: 3 MG/DL
WBC #/AREA URNS AUTO: ABNORMAL /HPF

## 2023-10-12 PROCEDURE — 87077 CULTURE AEROBIC IDENTIFY: CPT | Performed by: OBSTETRICS & GYNECOLOGY

## 2023-10-12 PROCEDURE — 99213 OFFICE O/P EST LOW 20 MIN: CPT | Performed by: OBSTETRICS & GYNECOLOGY

## 2023-10-12 PROCEDURE — 81001 URINALYSIS AUTO W/SCOPE: CPT | Performed by: OBSTETRICS & GYNECOLOGY

## 2023-10-12 PROCEDURE — 87086 URINE CULTURE/COLONY COUNT: CPT | Performed by: OBSTETRICS & GYNECOLOGY

## 2023-10-12 PROCEDURE — 87210 SMEAR WET MOUNT SALINE/INK: CPT | Performed by: OBSTETRICS & GYNECOLOGY

## 2023-10-12 PROCEDURE — 90471 IMMUNIZATION ADMIN: CPT | Performed by: OBSTETRICS & GYNECOLOGY

## 2023-10-12 PROCEDURE — 90651 9VHPV VACCINE 2/3 DOSE IM: CPT | Performed by: OBSTETRICS & GYNECOLOGY

## 2023-10-12 PROCEDURE — 87186 SC STD MICRODIL/AGAR DIL: CPT | Performed by: OBSTETRICS & GYNECOLOGY

## 2023-10-12 RX ORDER — FLUCONAZOLE 150 MG/1
150 TABLET ORAL ONCE
Qty: 1 TABLET | Refills: 0 | Status: SHIPPED | OUTPATIENT
Start: 2023-10-12 | End: 2023-10-12

## 2023-10-12 RX ORDER — NITROFURANTOIN 25; 75 MG/1; MG/1
100 CAPSULE ORAL 2 TIMES DAILY
Qty: 14 CAPSULE | Refills: 0 | Status: SHIPPED | OUTPATIENT
Start: 2023-10-12 | End: 2023-10-19

## 2023-10-12 NOTE — PROGRESS NOTES
PT was given second HPV vaccine in left arm.       AXY#:[id number]  NDC#: 0574-9303-52  Exp Date: 10/05/2025

## 2023-10-12 NOTE — PATIENT INSTRUCTIONS
Merissa por nguyễn confianza en nuestro equipo. Say Sandeep y agradecemos kendall comentarios. Si recibe laurence encuesta nuestra, tómese unos momentos para informarnos cómo estamos.    Sinceramente,  [facility], DO

## 2023-10-12 NOTE — PROGRESS NOTES
PROBLEM GYNECOLOGICAL VISIT    Raquel Burton is a 26 y.o. female who presents today for follow up. Her general medical history has been reviewed and she reports it as follows:    Past Medical History:   Diagnosis Date    Abnormal Pap smear of cervix     2023 ASCUS pap/+ other HRHPV    Asthma      Past Surgical History:   Procedure Laterality Date     SECTION  ,2018    LAPAROSCOPIC CHOLECYSTECTOMY  2018    ORIF ANKLE FRACTURE Left 2016     OB History          3    Para   2    Term   2       0    AB   1    Living   2         SAB   1    IAB   0    Ectopic   0    Multiple   0    Live Births   2               Social History     Tobacco Use    Smoking status: Never    Smokeless tobacco: Never   Vaping Use    Vaping Use: Never used   Substance Use Topics    Alcohol use: Yes     Comment: couple times/year    Drug use: Never     Social History     Substance and Sexual Activity   Sexual Activity Not Currently    Partners: Male    Birth control/protection: None       Current Outpatient Medications   Medication Instructions    albuterol (PROVENTIL HFA,VENTOLIN HFA) 90 mcg/act inhaler 2 puffs, Inhalation, Every 6 hours PRN    fluconazole (DIFLUCAN) 150 mg, Oral, Once    montelukast (SINGULAIR) 10 mg, Oral, Daily at bedtime    nitrofurantoin (MACROBID) 100 mg, Oral, 2 times daily       History of Present Illness:   Patient presents for follow up s/p colposcopy with c/o malodorous urine and vulvar itching with whitish vaginal discharge for the past 2 days. Patient admits to using a different soap. Review of Systems:  Review of Systems   Genitourinary:  Positive for vaginal discharge. Vulvar itching and malodorous urine   All other systems reviewed and are negative. Physical Exam:  /76   Pulse 85   Ht 5' 5" (1.651 m)   Wt 73.8 kg (162 lb 9.6 oz)   LMP 2023 (Exact Date)   BMI 27.06 kg/m²   Physical Exam  Constitutional:       Appearance: Normal appearance. Genitourinary:      Bladder and urethral meatus normal.      No lesions in the vagina. Right Labia: No rash, tenderness or lesions. Left Labia: No tenderness, lesions or rash. Vulva exam comments: Bilateral erythema. Vaginal discharge present. No cervical motion tenderness, discharge or lesion. No urethral tenderness or mass present. Neurological:      Mental Status: She is alert. Vitals reviewed. Discussion:  Discuss with patient pathology consist of DAVEY 1 recommend repeat pap in 1yr and continue with her Gardasil injections. Assessment:   1. DAVEY 1   2. UTI   3. Vulvitis    Plan:   1. Cultures ordered: urine and ua   2. Macroabid/diflucan   3. Return to office 10mos repap/annual.     Reviewed with patient that test results are available in MyChart immediately, but that they will not necessarily be reviewed by me immediately. Explained that I will review results at my earliest opportunity and contact patient appropriately.

## 2023-10-14 LAB — BACTERIA UR CULT: ABNORMAL

## 2024-01-08 ENCOUNTER — TELEPHONE (OUTPATIENT)
Dept: OBGYN CLINIC | Facility: CLINIC | Age: 34
End: 2024-01-08

## 2024-01-30 ENCOUNTER — OFFICE VISIT (OUTPATIENT)
Dept: OBGYN CLINIC | Facility: CLINIC | Age: 34
End: 2024-01-30

## 2024-01-30 VITALS
SYSTOLIC BLOOD PRESSURE: 98 MMHG | HEART RATE: 75 BPM | HEIGHT: 65 IN | WEIGHT: 172 LBS | BODY MASS INDEX: 28.66 KG/M2 | DIASTOLIC BLOOD PRESSURE: 66 MMHG

## 2024-01-30 DIAGNOSIS — B37.9 YEAST INFECTION: ICD-10-CM

## 2024-01-30 DIAGNOSIS — B96.89 BACTERIAL VAGINOSIS: Primary | ICD-10-CM

## 2024-01-30 DIAGNOSIS — N76.0 BACTERIAL VAGINOSIS: Primary | ICD-10-CM

## 2024-01-30 LAB
BV WHIFF TEST VAG QL: POSITIVE
CLUE CELLS SPEC QL WET PREP: POSITIVE
PH SMN: 5.5 [PH]
SL AMB POCT WET MOUNT: ABNORMAL
T VAGINALIS VAG QL WET PREP: NEGATIVE
YEAST VAG QL WET PREP: POSITIVE

## 2024-01-30 PROCEDURE — 87210 SMEAR WET MOUNT SALINE/INK: CPT | Performed by: NURSE PRACTITIONER

## 2024-01-30 PROCEDURE — 99213 OFFICE O/P EST LOW 20 MIN: CPT | Performed by: NURSE PRACTITIONER

## 2024-01-30 RX ORDER — METRONIDAZOLE 500 MG/1
500 TABLET ORAL EVERY 12 HOURS SCHEDULED
Qty: 14 TABLET | Refills: 0 | Status: SHIPPED | OUTPATIENT
Start: 2024-01-30 | End: 2024-02-06

## 2024-01-30 RX ORDER — FLUCONAZOLE 150 MG/1
150 TABLET ORAL ONCE
Qty: 1 TABLET | Refills: 1 | Status: SHIPPED | OUTPATIENT
Start: 2024-01-30 | End: 2024-01-30

## 2024-01-30 NOTE — PATIENT INSTRUCTIONS
Take metronidazole and Fluconazole as directed  Wear loose clothes and cotton underwear  Call with needs or concerns  Annual GYN exam and PAP are due after 8/20/2024

## 2024-01-30 NOTE — PROGRESS NOTES
"Assessment/Plan:     Diagnoses and all orders for this visit:    Bacterial vaginosis  -     POCT wet mount  -     metroNIDAZOLE (FLAGYL) 500 mg tablet; Take 1 tablet (500 mg total) by mouth every 12 (twelve) hours for 7 days    Yeast infection  -     POCT wet mount  -     fluconazole (DIFLUCAN) 150 mg tablet; Take 1 tablet (150 mg total) by mouth once for 1 dose      Plan  Take metronidazole and Fluconazole as directed  Wear loose clothes and cotton underwear  Call with needs or concerns  Annual GYN exam and PAP are due after 8/20/2024  Pt verbalized understanding of all discussed.      Subjective:      Patient ID: Promise Fabian is a 33 y.o. female.    HPI  Pt presents with C/O intermittent vaginal discharge with an odor, vaginal and vulvar itching and irritation for 3 months  8/20/2023 ASCUS PAP HPV other positive CINI on colpo    Explained wet mount was positive for yeast, and BV safe and effective use of Metronidazole and Fluconazole was provided, discussed comfort measures    Depression Screening Follow-up Plan: Patient's depression screening was negative with a PHQ-2 score of 0. Their PHQ-9 score was 0. Clinically patient does not have depression. No treatment is required.        The following portions of the patient's history were reviewed and updated as appropriate: allergies, current medications, past family history, past medical history, past social history, past surgical history and problem list.    Review of Systems    .Pertinent items are note in the HPI      Objective:      BP 98/66   Pulse 75   Ht 5' 5\" (1.651 m)   Wt 78 kg (172 lb)   LMP 01/12/2024 (Exact Date)   BMI 28.62 kg/m²          Physical Exam    Alert and oriented  WNL respiratory effort negative cough or SOB  Vulva negative lesions, positive erythema  Vagina positive thin gray/white discharge and patchy whote discharge on the walls of the vagina  Cervix negative lseions, positive thin gray discharge  Wet mount was positive for " BV and yeast

## 2024-02-12 ENCOUNTER — CLINICAL SUPPORT (OUTPATIENT)
Dept: OBGYN CLINIC | Facility: CLINIC | Age: 34
End: 2024-02-12

## 2024-02-12 VITALS
WEIGHT: 172.4 LBS | HEIGHT: 65 IN | HEART RATE: 79 BPM | DIASTOLIC BLOOD PRESSURE: 72 MMHG | SYSTOLIC BLOOD PRESSURE: 108 MMHG | BODY MASS INDEX: 28.72 KG/M2

## 2024-02-12 DIAGNOSIS — Z23 NEED FOR HPV VACCINATION: Primary | ICD-10-CM

## 2024-02-12 PROCEDURE — 90471 IMMUNIZATION ADMIN: CPT

## 2024-02-12 PROCEDURE — 90651 9VHPV VACCINE 2/3 DOSE IM: CPT

## 2024-02-12 NOTE — PROGRESS NOTES
Patient given 3rd HPV vaccine in right deltoid.     NDC# [id number]  LOT# [id number]  EXP# [id number]

## 2024-03-26 ENCOUNTER — TELEPHONE (OUTPATIENT)
Dept: OBGYN CLINIC | Facility: CLINIC | Age: 34
End: 2024-03-26

## 2024-05-09 ENCOUNTER — APPOINTMENT (OUTPATIENT)
Dept: LAB | Facility: CLINIC | Age: 34
End: 2024-05-09
Payer: MEDICAID

## 2024-05-09 ENCOUNTER — OFFICE VISIT (OUTPATIENT)
Dept: OBGYN CLINIC | Facility: CLINIC | Age: 34
End: 2024-05-09

## 2024-05-09 VITALS
SYSTOLIC BLOOD PRESSURE: 101 MMHG | DIASTOLIC BLOOD PRESSURE: 69 MMHG | HEIGHT: 65 IN | BODY MASS INDEX: 29.26 KG/M2 | WEIGHT: 175.6 LBS | HEART RATE: 70 BPM

## 2024-05-09 DIAGNOSIS — Z32.01 POSITIVE PREGNANCY TEST: ICD-10-CM

## 2024-05-09 DIAGNOSIS — Z11.3 SCREEN FOR STD (SEXUALLY TRANSMITTED DISEASE): ICD-10-CM

## 2024-05-09 DIAGNOSIS — N93.9 ABNORMAL UTERINE BLEEDING (AUB): ICD-10-CM

## 2024-05-09 DIAGNOSIS — N93.9 ABNORMAL UTERINE BLEEDING (AUB): Primary | ICD-10-CM

## 2024-05-09 LAB
B-HCG SERPL-ACNC: 219.1 MIU/ML (ref 0–5)
ERYTHROCYTE [DISTWIDTH] IN BLOOD BY AUTOMATED COUNT: 12.7 % (ref 11.6–15.1)
HCT VFR BLD AUTO: 36.7 % (ref 34.8–46.1)
HGB BLD-MCNC: 11.8 G/DL (ref 11.5–15.4)
HIV 1+2 AB+HIV1 P24 AG SERPL QL IA: NORMAL
HIV 2 AB SERPL QL IA: NORMAL
HIV1 AB SERPL QL IA: NORMAL
HIV1 P24 AG SERPL QL IA: NORMAL
MCH RBC QN AUTO: 30.3 PG (ref 26.8–34.3)
MCHC RBC AUTO-ENTMCNC: 32.2 G/DL (ref 31.4–37.4)
MCV RBC AUTO: 94 FL (ref 82–98)
PLATELET # BLD AUTO: 359 THOUSANDS/UL (ref 149–390)
PMV BLD AUTO: 9.6 FL (ref 8.9–12.7)
RBC # BLD AUTO: 3.89 MILLION/UL (ref 3.81–5.12)
WBC # BLD AUTO: 5.35 THOUSAND/UL (ref 4.31–10.16)

## 2024-05-09 PROCEDURE — 87389 HIV-1 AG W/HIV-1&-2 AB AG IA: CPT

## 2024-05-09 PROCEDURE — 84702 CHORIONIC GONADOTROPIN TEST: CPT

## 2024-05-09 PROCEDURE — 99213 OFFICE O/P EST LOW 20 MIN: CPT | Performed by: OBSTETRICS & GYNECOLOGY

## 2024-05-09 PROCEDURE — 85027 COMPLETE CBC AUTOMATED: CPT

## 2024-05-09 PROCEDURE — 36415 COLL VENOUS BLD VENIPUNCTURE: CPT

## 2024-05-09 NOTE — PROGRESS NOTES
[facility]  Promise Fabian  [id number]  1990      Subjective     Promise Fabian is a 34 y.o. female here presenting with abnormal bleeding.  She has been bleeding since early March, she says. It has been heavier at times but also lighter. Her last regular period was 24. She then took a pregnancy test at the end of February, and it was positive. Her periods were regular before all of this.    She was recently seen in the ED at [facility] with abdominal pain and bleeding. She had an ultrasound done without a visualized IUP and a bHCG of 501.     She does not currently have abdominal pain. She denies fevers/chills, foul-smelling discharge. We discussed returning to the ED if she were to start experiencing severe symptoms again.    Discussed that this is most likely a miscarriage but we cannot rule out an ectopic pregnancy at this point. Plan to repeat bHCG today and obtain another ultrasound. If bHCG continues to decrease this would support diagnosis of miscarriage, and we can continue expectant management. She is interested in having her Depo shot done, but I advised her to wait until we complete these additional tests.     Gynecologic History  Patient's last menstrual period was 2024 (exact date).  Contraception: She would like Depo, will wait to administer for now      Obstetric History  OB History    Para Term  AB Living   3 2 2 0 1 2   SAB IAB Ectopic Multiple Live Births   1 0 0 0 2      # Outcome Date GA Lbr Jevon/2nd Weight Sex Delivery Anes PTL Lv   3 2022     SAB      2 Term      CS-Unspec      1 Term      CS-Unspec            Review of Systems   Constitutional:  Negative for chills and fever.   Eyes:  Negative for visual disturbance.   Respiratory:  Negative for shortness of breath.    Cardiovascular:  Negative for chest pain.   Gastrointestinal:  Negative for abdominal pain, diarrhea, nausea and vomiting.   Genitourinary:  Positive for  vaginal bleeding. Negative for dysuria, flank pain, hematuria and vaginal discharge.   Skin:  Negative for rash.   Neurological:  Negative for dizziness, numbness and headaches.   All other systems reviewed and are negative.       Objective    Vitals:    05/09/24 0920   BP: 101/69   Pulse: 70     Physical Exam  Constitutional:       General: She is not in acute distress.     Appearance: Normal appearance.   Genitourinary:      Genitourinary Comments: deferred   HENT:      Head: Normocephalic and atraumatic.   Eyes:      Extraocular Movements: Extraocular movements intact.   Cardiovascular:      Rate and Rhythm: Normal rate.      Pulses: Normal pulses.   Pulmonary:      Effort: Pulmonary effort is normal. No respiratory distress.   Abdominal:      General: There is no distension.      Palpations: Abdomen is soft.      Tenderness: There is no abdominal tenderness. There is no guarding.   Musculoskeletal:         General: Normal range of motion.      Cervical back: Normal range of motion.   Neurological:      General: No focal deficit present.      Mental Status: She is alert. Mental status is at baseline.   Skin:     General: Skin is warm and dry.   Psychiatric:         Mood and Affect: Mood normal.         Behavior: Behavior normal.   Vitals reviewed. Exam conducted with a chaperone present.         Plan    Will repeat bHCG and obtain another ultrasound.   Can plan to follow with serial bHCG levels if appropriate

## 2024-05-13 ENCOUNTER — TELEPHONE (OUTPATIENT)
Dept: OBGYN CLINIC | Facility: CLINIC | Age: 34
End: 2024-05-13

## 2024-05-13 DIAGNOSIS — N93.9 ABNORMAL UTERINE BLEEDING (AUB): ICD-10-CM

## 2024-05-13 DIAGNOSIS — Z32.01 POSITIVE PREGNANCY TEST: Primary | ICD-10-CM

## 2024-05-13 NOTE — TELEPHONE ENCOUNTER
Interpretor ID: [id number]    Called patient to discuss latest bHCG results and ask her to have a repeat level drawn. She has an ultrasound appointment on Wednesday 5/16, and she says she can do it on the same day.

## 2024-05-16 ENCOUNTER — HOSPITAL ENCOUNTER (OUTPATIENT)
Dept: ULTRASOUND IMAGING | Facility: HOSPITAL | Age: 34
Discharge: HOME | End: 2024-05-16

## 2024-05-16 DIAGNOSIS — N93.9 ABNORMAL UTERINE BLEEDING (AUB): ICD-10-CM

## 2024-05-16 PROCEDURE — 76815 OB US LIMITED FETUS(S): CPT

## 2024-05-21 ENCOUNTER — TELEPHONE (OUTPATIENT)
Dept: OBGYN CLINIC | Facility: CLINIC | Age: 34
End: 2024-05-21

## 2024-05-22 ENCOUNTER — TELEPHONE (OUTPATIENT)
Dept: OBGYN CLINIC | Facility: CLINIC | Age: 34
End: 2024-05-22

## 2024-05-22 ENCOUNTER — OFFICE VISIT (OUTPATIENT)
Dept: OBGYN CLINIC | Facility: CLINIC | Age: 34
End: 2024-05-22

## 2024-05-22 ENCOUNTER — APPOINTMENT (OUTPATIENT)
Dept: LAB | Facility: CLINIC | Age: 34
End: 2024-05-22

## 2024-05-22 VITALS
HEIGHT: 65 IN | DIASTOLIC BLOOD PRESSURE: 68 MMHG | SYSTOLIC BLOOD PRESSURE: 102 MMHG | HEART RATE: 76 BPM | WEIGHT: 177 LBS | BODY MASS INDEX: 29.49 KG/M2

## 2024-05-22 DIAGNOSIS — Z32.01 POSITIVE PREGNANCY TEST: ICD-10-CM

## 2024-05-22 DIAGNOSIS — O03.9 SAB (SPONTANEOUS ABORTION): Primary | ICD-10-CM

## 2024-05-22 DIAGNOSIS — N93.9 ABNORMAL UTERINE BLEEDING (AUB): ICD-10-CM

## 2024-05-22 LAB
B-HCG SERPL-ACNC: 21.2 MIU/ML (ref 0–5)
TREPONEMA PALLIDUM IGG+IGM AB [PRESENCE] IN SERUM OR PLASMA BY IMMUNOASSAY: NORMAL

## 2024-05-22 PROCEDURE — 86780 TREPONEMA PALLIDUM: CPT

## 2024-05-22 PROCEDURE — 36415 COLL VENOUS BLD VENIPUNCTURE: CPT

## 2024-05-22 PROCEDURE — 99213 OFFICE O/P EST LOW 20 MIN: CPT | Performed by: OBSTETRICS & GYNECOLOGY

## 2024-05-22 PROCEDURE — 84702 CHORIONIC GONADOTROPIN TEST: CPT

## 2024-05-22 NOTE — PATIENT INSTRUCTIONS
Merissa por nguyễn confianza en nuestro equipo.   Le agradecemos y agradecemos kendall comentarios.   Si recibe laurence encuesta nuestra, tómese unos momentos para informarnos cómo estamos.   Sinceramente,  Yacristinoe Toussaint-Foster, DO

## 2024-05-22 NOTE — PROGRESS NOTES
"PROBLEM GYNECOLOGICAL VISIT    Promise Fabian is a 34 y.o. female who presents today results.  Her general medical history has been reviewed and she reports it as follows:    Past Medical History:   Diagnosis Date    Abnormal Pap smear of cervix     2023 ASCUS pap/+ other HRHPV    Asthma      Past Surgical History:   Procedure Laterality Date     SECTION  ,2018    LAPAROSCOPIC CHOLECYSTECTOMY  2018    ORIF ANKLE FRACTURE Left 2016     OB History          3    Para   2    Term   2       0    AB   1    Living   2         SAB   1    IAB   0    Ectopic   0    Multiple   0    Live Births   2               Social History     Tobacco Use    Smoking status: Never     Passive exposure: Never    Smokeless tobacco: Never   Vaping Use    Vaping status: Never Used   Substance Use Topics    Alcohol use: Yes     Comment: couple times/year    Drug use: Never     Social History     Substance and Sexual Activity   Sexual Activity Not Currently    Partners: Male    Birth control/protection: None       Current Outpatient Medications   Medication Instructions    albuterol (PROVENTIL HFA,VENTOLIN HFA) 90 mcg/act inhaler 2 puffs, Inhalation, Every 6 hours PRN    montelukast (SINGULAIR) 10 mg, Oral, Daily at bedtime       History of Present Illness:   Patient presents for follow up s/p pelvic sonogram for positive urine pregnancy and vaginal bleeding.  Presently patient states no bleeding and she had a menses on 24 and has not had any intercourse.    Review of Systems:  Review of Systems   Genitourinary:  Negative for pelvic pain, vaginal bleeding and vaginal discharge.   All other systems reviewed and are negative.      Physical Exam:  /68 (BP Location: Left arm, Patient Position: Sitting, Cuff Size: Standard)   Pulse 76   Ht 5' 5\" (1.651 m)   Wt 80.3 kg (177 lb)   LMP 2024 (Exact Date)   BMI 29.45 kg/m²   Physical Exam  Constitutional:       Appearance: Normal appearance. "   Neurological:      Mental Status: She is alert.   Vitals reviewed.         Discussion:  Discuss with patient pelvic sonogram with normal findings and no IUP noted.  Recommend patient get a repeat hcg which was ordered for further evaluation.     Assessment:   1. Normal pelvic sonogram   2. SAB    Plan:      1. Bloodwork ordered: hcg   2. Return to office prn.     Reviewed with patient that [facility], but that they will not necessarily be reviewed by me immediately.  Explained that I will review results at my earliest opportunity and contact patient appropriately.

## 2024-05-22 NOTE — TELEPHONE ENCOUNTER
[facility] called and left voice message stating the US has significant findings and needs follow-up.

## 2024-05-29 ENCOUNTER — TELEPHONE (OUTPATIENT)
Dept: OBGYN CLINIC | Facility: CLINIC | Age: 34
End: 2024-05-29

## 2024-07-01 ENCOUNTER — TELEPHONE (OUTPATIENT)
Dept: OBGYN CLINIC | Facility: CLINIC | Age: 34
End: 2024-07-01

## 2024-10-05 ENCOUNTER — ANNUAL EXAM (OUTPATIENT)
Dept: OBGYN CLINIC | Facility: CLINIC | Age: 34
End: 2024-10-05

## 2024-10-05 VITALS
DIASTOLIC BLOOD PRESSURE: 70 MMHG | HEART RATE: 81 BPM | WEIGHT: 172 LBS | BODY MASS INDEX: 28.62 KG/M2 | SYSTOLIC BLOOD PRESSURE: 108 MMHG

## 2024-10-05 DIAGNOSIS — Z12.4 SCREENING FOR CERVICAL CANCER: ICD-10-CM

## 2024-10-05 DIAGNOSIS — Z01.419 ENCOUNTER FOR GYNECOLOGICAL EXAMINATION WITHOUT ABNORMAL FINDING: Primary | ICD-10-CM

## 2024-10-05 DIAGNOSIS — Z12.39 ENCOUNTER FOR BREAST CANCER SCREENING USING NON-MAMMOGRAM MODALITY: ICD-10-CM

## 2024-10-05 DIAGNOSIS — Z11.51 SCREENING FOR HPV (HUMAN PAPILLOMAVIRUS): ICD-10-CM

## 2024-10-05 PROCEDURE — G0145 SCR C/V CYTO,THINLAYER,RESCR: HCPCS | Performed by: NURSE PRACTITIONER

## 2024-10-05 PROCEDURE — 99395 PREV VISIT EST AGE 18-39: CPT | Performed by: NURSE PRACTITIONER

## 2024-10-05 PROCEDURE — G0476 HPV COMBO ASSAY CA SCREEN: HCPCS | Performed by: NURSE PRACTITIONER

## 2024-10-05 NOTE — PATIENT INSTRUCTIONS
Merissa por nguyễn confianza en nuestro equipo.   Le agradecemos y agradecemos kendall comentarios.   Si recibe laurence encuesta nuestra, tómese unos momentos para informarnos cómo estamos.   Sinceramente,  BRIANNA Pugh

## 2024-10-05 NOTE — PROGRESS NOTES
ANNUAL GYNECOLOGICAL EXAMINATION    Promise Fabian is a 34 y.o. female who presents today for annual GYN exam.  Her last pap smear was performed 2023 and result was ASCUS with + other HRHPV.   She had HIV screening performed 2024 and it was negative.  She reports menses as regular.  Patient's last menstrual period was 2024 (exact date).  Her general medical history has been reviewed and she reports it as follows:    Past Medical History:   Diagnosis Date    Abnormal Pap smear of cervix     2023 ASCUS pap/+ other HRHPV; 2023 colpo CIN1    Asthma      Past Surgical History:   Procedure Laterality Date     SECTION  ,2018    LAPAROSCOPIC CHOLECYSTECTOMY  2018    ORIF ANKLE FRACTURE Left 2016     OB History          3    Para   2    Term   2       0    AB   1    Living   2         SAB   1    IAB   0    Ectopic   0    Multiple   0    Live Births   2               Social History     Tobacco Use    Smoking status: Never     Passive exposure: Never    Smokeless tobacco: Never   Vaping Use    Vaping status: Never Used   Substance Use Topics    Alcohol use: Yes     Comment: couple times/year    Drug use: Never     Social History     Substance and Sexual Activity   Sexual Activity Yes    Partners: Male    Birth control/protection: None     Cancer-related family history includes Cancer in her maternal grandfather and paternal grandmother. There is no history of Breast cancer, Colon cancer, or Ovarian cancer.    Current Outpatient Medications   Medication Instructions    albuterol (PROVENTIL HFA,VENTOLIN HFA) 90 mcg/act inhaler 2 puffs, Inhalation, Every 6 hours PRN    montelukast (SINGULAIR) 10 mg, Oral, Daily at bedtime       Review of Systems:  Review of Systems   Constitutional: Negative.    Gastrointestinal: Negative.    Genitourinary:  Negative for difficulty urinating, menstrual problem, pelvic pain and vaginal discharge.   Skin: Negative.        Physical Exam:  /70  (BP Location: Left arm, Patient Position: Sitting, Cuff Size: Standard)   Pulse 81   Wt 78 kg (172 lb)   LMP 09/18/2024 (Exact Date)   BMI 28.62 kg/m²   Physical Exam  Constitutional:       General: She is not in acute distress.     Appearance: She is well-developed.   Genitourinary:      Vulva normal.      No lesions in the vagina.      Vaginal discharge (clear, mucousy) present.        Right Adnexa: not tender and no mass present.     Left Adnexa: not tender and no mass present.     No cervical motion tenderness or lesion.      Uterus is not tender.   Breasts:     Right: No mass, nipple discharge, skin change or tenderness.      Left: No mass, nipple discharge, skin change or tenderness.   Neck:      Thyroid: No thyromegaly.   Cardiovascular:      Rate and Rhythm: Normal rate and regular rhythm.   Pulmonary:      Effort: Pulmonary effort is normal.   Abdominal:      Palpations: Abdomen is soft.      Tenderness: There is no abdominal tenderness.   Musculoskeletal:      Cervical back: Neck supple.   Neurological:      Mental Status: She is alert and oriented to person, place, and time.   Skin:     General: Skin is warm and dry.   Vitals reviewed.       Assessment/Plan:   1. Normal well-woman GYN exam.  2. Cervical cancer screening:  Normal cervical exam.  Pap smear done with HPV co-testing.  Has received full HPV vaccine series in the past.   3. STD screening:  Patient declines due to no health insurance.  Given information for [facility] for au gratis screening.   4. Breast cancer screening:  Normal breast exam.  Reviewed breast self-awareness.   5. Depression Screening: Patient's depression screening was assessed with a PHQ-2 score of 0. Clinically patient does not have depression. No treatment is required.   6. BMI Counseling: Body mass index is 28.62 kg/m².  No intervention indicated.   7. Contraception:  Desires to return to discuss options.   8. Return to office in 1 year for annual GYN  exam.    Reviewed with patient that test results are available in MyChart immediately, but that they will not necessarily be reviewed by me immediately.  Explained that I will review results at my earliest opportunity and contact patient appropriately.

## 2024-10-07 LAB
HPV HR 12 DNA CVX QL NAA+PROBE: NEGATIVE
HPV16 DNA CVX QL NAA+PROBE: NEGATIVE
HPV18 DNA CVX QL NAA+PROBE: NEGATIVE

## 2024-10-11 LAB
LAB AP GYN PRIMARY INTERPRETATION: NORMAL
Lab: NORMAL

## 2024-10-24 ENCOUNTER — TELEPHONE (OUTPATIENT)
Dept: OBGYN CLINIC | Facility: CLINIC | Age: 34
End: 2024-10-24

## 2025-03-18 ENCOUNTER — TELEPHONE (OUTPATIENT)
Dept: FAMILY MEDICINE CLINIC | Facility: CLINIC | Age: 35
End: 2025-03-18

## 2025-03-19 ENCOUNTER — OFFICE VISIT (OUTPATIENT)
Dept: FAMILY MEDICINE CLINIC | Facility: CLINIC | Age: 35
End: 2025-03-19

## 2025-03-19 ENCOUNTER — APPOINTMENT (OUTPATIENT)
Dept: LAB | Facility: CLINIC | Age: 35
End: 2025-03-19

## 2025-03-19 VITALS
TEMPERATURE: 97.8 F | SYSTOLIC BLOOD PRESSURE: 112 MMHG | BODY MASS INDEX: 30.32 KG/M2 | HEART RATE: 98 BPM | WEIGHT: 182 LBS | HEIGHT: 65 IN | RESPIRATION RATE: 17 BRPM | DIASTOLIC BLOOD PRESSURE: 66 MMHG | OXYGEN SATURATION: 97 %

## 2025-03-19 DIAGNOSIS — Z76.89 ENCOUNTER TO ESTABLISH CARE: Primary | ICD-10-CM

## 2025-03-19 DIAGNOSIS — Z23 ENCOUNTER FOR IMMUNIZATION: ICD-10-CM

## 2025-03-19 DIAGNOSIS — J45.20 MILD INTERMITTENT ASTHMA WITHOUT COMPLICATION: ICD-10-CM

## 2025-03-19 DIAGNOSIS — E66.9 OBESITY (BMI 30-39.9): ICD-10-CM

## 2025-03-19 LAB
BASOPHILS # BLD AUTO: 0.02 THOUSANDS/ÂΜL (ref 0–0.1)
BASOPHILS NFR BLD AUTO: 0 % (ref 0–1)
EOSINOPHIL # BLD AUTO: 0.06 THOUSAND/ÂΜL (ref 0–0.61)
EOSINOPHIL NFR BLD AUTO: 1 % (ref 0–6)
ERYTHROCYTE [DISTWIDTH] IN BLOOD BY AUTOMATED COUNT: 13.2 % (ref 11.6–15.1)
EST. AVERAGE GLUCOSE BLD GHB EST-MCNC: 105 MG/DL
HBA1C MFR BLD: 5.3 %
HCT VFR BLD AUTO: 39.2 % (ref 34.8–46.1)
HGB BLD-MCNC: 12.4 G/DL (ref 11.5–15.4)
IMM GRANULOCYTES # BLD AUTO: 0.01 THOUSAND/UL (ref 0–0.2)
IMM GRANULOCYTES NFR BLD AUTO: 0 % (ref 0–2)
LYMPHOCYTES # BLD AUTO: 1.83 THOUSANDS/ÂΜL (ref 0.6–4.47)
LYMPHOCYTES NFR BLD AUTO: 28 % (ref 14–44)
MCH RBC QN AUTO: 29.5 PG (ref 26.8–34.3)
MCHC RBC AUTO-ENTMCNC: 31.6 G/DL (ref 31.4–37.4)
MCV RBC AUTO: 93 FL (ref 82–98)
MONOCYTES # BLD AUTO: 0.36 THOUSAND/ÂΜL (ref 0.17–1.22)
MONOCYTES NFR BLD AUTO: 6 % (ref 4–12)
NEUTROPHILS # BLD AUTO: 4.22 THOUSANDS/ÂΜL (ref 1.85–7.62)
NEUTS SEG NFR BLD AUTO: 65 % (ref 43–75)
NRBC BLD AUTO-RTO: 0 /100 WBCS
PLATELET # BLD AUTO: 397 THOUSANDS/UL (ref 149–390)
PMV BLD AUTO: 9.7 FL (ref 8.9–12.7)
RBC # BLD AUTO: 4.2 MILLION/UL (ref 3.81–5.12)
TSH SERPL DL<=0.05 MIU/L-ACNC: 1.42 UIU/ML (ref 0.45–4.5)
WBC # BLD AUTO: 6.5 THOUSAND/UL (ref 4.31–10.16)

## 2025-03-19 PROCEDURE — 36415 COLL VENOUS BLD VENIPUNCTURE: CPT

## 2025-03-19 PROCEDURE — 90471 IMMUNIZATION ADMIN: CPT

## 2025-03-19 PROCEDURE — 87340 HEPATITIS B SURFACE AG IA: CPT

## 2025-03-19 PROCEDURE — 90677 PCV20 VACCINE IM: CPT

## 2025-03-19 PROCEDURE — 80061 LIPID PANEL: CPT

## 2025-03-19 PROCEDURE — 84443 ASSAY THYROID STIM HORMONE: CPT

## 2025-03-19 PROCEDURE — 99214 OFFICE O/P EST MOD 30 MIN: CPT

## 2025-03-19 PROCEDURE — 86803 HEPATITIS C AB TEST: CPT

## 2025-03-19 PROCEDURE — 85025 COMPLETE CBC W/AUTO DIFF WBC: CPT

## 2025-03-19 PROCEDURE — 80053 COMPREHEN METABOLIC PANEL: CPT

## 2025-03-19 PROCEDURE — 83036 HEMOGLOBIN GLYCOSYLATED A1C: CPT

## 2025-03-19 RX ORDER — ALBUTEROL SULFATE 90 UG/1
2 INHALANT RESPIRATORY (INHALATION) EVERY 6 HOURS PRN
Qty: 6.7 G | Refills: 2 | Status: SHIPPED | OUTPATIENT
Start: 2025-03-19

## 2025-03-19 NOTE — ASSESSMENT & PLAN NOTE
-Stable  -Continue BISHOP PRN    Orders:    albuterol (PROVENTIL HFA,VENTOLIN HFA) 90 mcg/act inhaler; Inhale 2 puffs every 6 (six) hours as needed for wheezing

## 2025-03-19 NOTE — PROGRESS NOTES
Name: Promise Fabian      : 1990      MRN: [id number]  Encounter Provider: BRIANNA Pereyra  Encounter Date: 3/19/2025   Encounter department: [facility] [place]  :  Assessment & Plan  Encounter to establish care         Mild intermittent asthma without complication  -Stable  -Continue BISHOP PRN    Orders:    albuterol (PROVENTIL HFA,VENTOLIN HFA) 90 mcg/act inhaler; Inhale 2 puffs every 6 (six) hours as needed for wheezing    Obesity (BMI 30-39.9)  Body mass index is 30.29 kg/m².  The BMI is above normal. Nutrition recommendations include reducing portion sizes, decreasing overall calorie intake, 3-5 servings of fruits/vegetables daily, reducing fast food intake, consuming healthier snacks, decreasing soda and/or juice intake, moderation in carbohydrate intake, increasing intake of lean protein, reducing intake of saturated fat and trans fat and reducing intake of cholesterol. Exercise recommendations include moderate aerobic physical activity for 150 minutes/week.      Orders:    Comprehensive metabolic panel; Future    CBC and differential; Future    TSH, 3rd generation with Free T4 reflex; Future    Lipid panel; Future    Hemoglobin A1C; Future           History of Present Illness   Promise Fabian is a 35 y.o. with  has a past medical history of Abnormal Pap smear of cervix and Asthma.     Patient is here to establish care. [place]ian language interpretation services were utilized for this visit.     Patient presents to the clinic for management of her chronic medical conditions.  Patient's medical conditions are stable unless noted otherwise above.  Patient has no further complaints other than what is mentioned in the ROS.        Review of Systems   Constitutional: Negative.  Negative for chills, fatigue and fever.   HENT: Negative.  Negative for ear pain and sore throat.    Eyes: Negative.  Negative for pain and visual disturbance.   Respiratory: Negative.   "Negative for cough and shortness of breath.    Cardiovascular: Negative.  Negative for chest pain and palpitations.   Gastrointestinal: Negative.  Negative for abdominal pain and vomiting.   Genitourinary: Negative.  Negative for dysuria and hematuria.   Musculoskeletal: Negative.  Negative for arthralgias and back pain.   Skin: Negative.  Negative for color change and rash.   Neurological: Negative.  Negative for seizures and syncope.   Hematological: Negative.    Psychiatric/Behavioral: Negative.  Negative for behavioral problems.    All other systems reviewed and are negative.      Objective   /66 (BP Location: Left arm, Patient Position: Sitting, Cuff Size: Standard)   Pulse 98   Temp 97.8 °F (36.6 °C) (Temporal)   Resp 17   Ht 5' 5\" (1.651 m)   Wt 82.6 kg (182 lb)   SpO2 97%   BMI 30.29 kg/m²      Physical Exam  Vitals and nursing note reviewed.   Constitutional:       General: She is not in acute distress.     Appearance: Normal appearance. She is well-developed. She is obese.   HENT:      Head: Normocephalic and atraumatic.      Right Ear: Tympanic membrane normal.      Left Ear: Tympanic membrane normal.      Nose: Nose normal.      Mouth/Throat:      Mouth: Mucous membranes are moist.   Eyes:      Conjunctiva/sclera: Conjunctivae normal.   Cardiovascular:      Rate and Rhythm: Normal rate and regular rhythm.      Pulses: Normal pulses.      Heart sounds: Normal heart sounds. No murmur heard.  Pulmonary:      Effort: Pulmonary effort is normal. No respiratory distress.      Breath sounds: Normal breath sounds.   Abdominal:      General: Abdomen is flat. Bowel sounds are normal.      Palpations: Abdomen is soft.      Tenderness: There is no abdominal tenderness.   Musculoskeletal:         General: No swelling. Normal range of motion.      Cervical back: Normal range of motion and neck supple.   Skin:     General: Skin is warm and dry.      Capillary Refill: Capillary refill takes less than 2 " seconds.   Neurological:      General: No focal deficit present.      Mental Status: She is alert and oriented to person, place, and time. Mental status is at baseline.   Psychiatric:         Mood and Affect: Mood normal.         Behavior: Behavior normal.         Thought Content: Thought content normal.         Judgment: Judgment normal.

## 2025-03-20 ENCOUNTER — OFFICE VISIT (OUTPATIENT)
Dept: OBGYN CLINIC | Facility: CLINIC | Age: 35
End: 2025-03-20

## 2025-03-20 ENCOUNTER — RESULTS FOLLOW-UP (OUTPATIENT)
Dept: FAMILY MEDICINE CLINIC | Facility: CLINIC | Age: 35
End: 2025-03-20

## 2025-03-20 VITALS
HEIGHT: 65 IN | SYSTOLIC BLOOD PRESSURE: 122 MMHG | DIASTOLIC BLOOD PRESSURE: 72 MMHG | WEIGHT: 183.4 LBS | BODY MASS INDEX: 30.56 KG/M2

## 2025-03-20 DIAGNOSIS — N89.8 VAGINAL ODOR: ICD-10-CM

## 2025-03-20 DIAGNOSIS — N76.0 BV (BACTERIAL VAGINOSIS): ICD-10-CM

## 2025-03-20 DIAGNOSIS — B37.31 VAGINAL CANDIDIASIS: ICD-10-CM

## 2025-03-20 DIAGNOSIS — B96.89 BV (BACTERIAL VAGINOSIS): ICD-10-CM

## 2025-03-20 DIAGNOSIS — N89.8 VAGINA ITCHING: Primary | ICD-10-CM

## 2025-03-20 LAB
ALBUMIN SERPL BCG-MCNC: 4.1 G/DL (ref 3.5–5)
ALP SERPL-CCNC: 62 U/L (ref 34–104)
ALT SERPL W P-5'-P-CCNC: 45 U/L (ref 7–52)
ANION GAP SERPL CALCULATED.3IONS-SCNC: 12 MMOL/L (ref 4–13)
AST SERPL W P-5'-P-CCNC: 27 U/L (ref 13–39)
BILIRUB SERPL-MCNC: 0.75 MG/DL (ref 0.2–1)
BUN SERPL-MCNC: 14 MG/DL (ref 5–25)
BV WHIFF TEST VAG QL: ABNORMAL
CALCIUM SERPL-MCNC: 8.9 MG/DL (ref 8.4–10.2)
CHLORIDE SERPL-SCNC: 102 MMOL/L (ref 96–108)
CHOLEST SERPL-MCNC: 168 MG/DL (ref ?–200)
CLUE CELLS SPEC QL WET PREP: ABNORMAL
CO2 SERPL-SCNC: 25 MMOL/L (ref 21–32)
CREAT SERPL-MCNC: 0.69 MG/DL (ref 0.6–1.3)
GFR SERPL CREATININE-BSD FRML MDRD: 111 ML/MIN/1.73SQ M
GLUCOSE P FAST SERPL-MCNC: 97 MG/DL (ref 65–99)
HBV SURFACE AG SER QL: NORMAL
HCV AB SER QL: NORMAL
HDLC SERPL-MCNC: 58 MG/DL
LDLC SERPL CALC-MCNC: 74 MG/DL (ref 0–100)
NONHDLC SERPL-MCNC: 110 MG/DL
PH SMN: 4.5 [PH]
POTASSIUM SERPL-SCNC: 4.2 MMOL/L (ref 3.5–5.3)
PROT SERPL-MCNC: 7.6 G/DL (ref 6.4–8.4)
SL AMB POCT WET MOUNT: ABNORMAL
SODIUM SERPL-SCNC: 139 MMOL/L (ref 135–147)
T VAGINALIS VAG QL WET PREP: ABNORMAL
TRIGL SERPL-MCNC: 179 MG/DL (ref ?–150)
YEAST VAG QL WET PREP: ABNORMAL

## 2025-03-20 PROCEDURE — 87591 N.GONORRHOEAE DNA AMP PROB: CPT | Performed by: NURSE PRACTITIONER

## 2025-03-20 PROCEDURE — 87563 M. GENITALIUM AMP PROBE: CPT | Performed by: NURSE PRACTITIONER

## 2025-03-20 PROCEDURE — 87210 SMEAR WET MOUNT SALINE/INK: CPT | Performed by: NURSE PRACTITIONER

## 2025-03-20 PROCEDURE — 87491 CHLMYD TRACH DNA AMP PROBE: CPT | Performed by: NURSE PRACTITIONER

## 2025-03-20 PROCEDURE — 99213 OFFICE O/P EST LOW 20 MIN: CPT | Performed by: NURSE PRACTITIONER

## 2025-03-20 PROCEDURE — 87661 TRICHOMONAS VAGINALIS AMPLIF: CPT | Performed by: NURSE PRACTITIONER

## 2025-03-20 RX ORDER — METRONIDAZOLE 500 MG/1
500 TABLET ORAL 2 TIMES DAILY
Qty: 14 TABLET | Refills: 0 | Status: SHIPPED | OUTPATIENT
Start: 2025-03-20 | End: 2025-03-27

## 2025-03-20 RX ORDER — FLUCONAZOLE 150 MG/1
150 TABLET ORAL ONCE
Qty: 1 TABLET | Refills: 0 | Status: SHIPPED | OUTPATIENT
Start: 2025-03-20 | End: 2025-03-20

## 2025-03-20 NOTE — PROGRESS NOTES
"PROBLEM GYNECOLOGICAL VISIT    Promise Fabian is a 35 y.o. female who presents today with complaint of vaginal odor.  Her general medical history has been reviewed and she reports it as follows:    Past Medical History:   Diagnosis Date    Abnormal Pap smear of cervix     2023 ASCUS pap/+ other HRHPV; 2023 colpo CIN1; 10/2024 NILM pap/neg HPV    Asthma      Past Surgical History:   Procedure Laterality Date     SECTION  ,2018    LAPAROSCOPIC CHOLECYSTECTOMY  2018    ORIF ANKLE FRACTURE Left 2016     OB History          3    Para   2    Term   2       0    AB   1    Living   2         SAB   1    IAB   0    Ectopic   0    Multiple   0    Live Births   2               Social History     Tobacco Use    Smoking status: Never     Passive exposure: Never    Smokeless tobacco: Never   Vaping Use    Vaping status: Never Used   Substance Use Topics    Alcohol use: Yes     Comment: couple times/year    Drug use: Never     Social History     Substance and Sexual Activity   Sexual Activity Yes    Partners: Male    Birth control/protection: None       Current Outpatient Medications   Medication Instructions    albuterol (PROVENTIL HFA,VENTOLIN HFA) 90 mcg/act inhaler 2 puffs, Inhalation, Every 6 hours PRN       History of Present Illness:   Reports for the past month she has been experiencing vaginal odor and itching.  Denies vaginal discharge.  Denies pelvic pain or dysuria.  She is sexually active and does use condoms.    Review of Systems:  Review of Systems   Constitutional: Negative.    Gastrointestinal: Negative.    Genitourinary:  Positive for vaginal discharge (and odor) and vaginal pain (itching).       Physical Exam:  /72 (BP Location: Left arm, Patient Position: Sitting, Cuff Size: Standard)   Ht 5' 5\" (1.651 m)   Wt 83.2 kg (183 lb 6.4 oz)   LMP 2025 (Exact Date)   BMI 30.52 kg/m²   Physical Exam  Constitutional:       General: She is not in acute " distress.  Genitourinary:      Vulva exam comments: Normal.      Vaginal discharge and erythema present.   Abdominal:      Palpations: Abdomen is soft.      Tenderness: There is no abdominal tenderness.   Neurological:      Mental Status: She is alert.   Skin:     General: Skin is warm and dry.   Vitals reviewed.     Point of Care Testing:   -Wet mount: + clue cells, no trichomonads, few WBC's, pH=4.5   -KOH mount: + hyphae   -Whiff: positive    Assessment:   1. BV.   2. Vaginal Candidiasis.    Plan:   1. Cultures ordered: GC/CT, trichomonas/mycoplasma genitalium.   2. Given Rx Flagyl and Diflucan.   3. Return to office as previously scheduled for annual GYN exam.    Reviewed with patient that test results are available in MyChart immediately, but that they will not necessarily be reviewed by me immediately.  Explained that I will review results at my earliest opportunity and contact patient appropriately.

## 2025-03-21 ENCOUNTER — RESULTS FOLLOW-UP (OUTPATIENT)
Dept: OBGYN CLINIC | Facility: CLINIC | Age: 35
End: 2025-03-21

## 2025-03-21 LAB
C TRACH DNA SPEC QL NAA+PROBE: NEGATIVE
M GENITALIUM DNA SPEC QL NAA+PROBE: NEGATIVE
N GONORRHOEA DNA SPEC QL NAA+PROBE: NEGATIVE
T VAGINALIS DNA SPEC QL NAA+PROBE: NEGATIVE

## 2025-03-25 ENCOUNTER — OFFICE VISIT (OUTPATIENT)
Dept: FAMILY MEDICINE CLINIC | Facility: CLINIC | Age: 35
End: 2025-03-25

## 2025-03-25 ENCOUNTER — APPOINTMENT (OUTPATIENT)
Dept: LAB | Facility: CLINIC | Age: 35
End: 2025-03-25

## 2025-03-25 VITALS
HEART RATE: 76 BPM | RESPIRATION RATE: 17 BRPM | SYSTOLIC BLOOD PRESSURE: 126 MMHG | OXYGEN SATURATION: 99 % | DIASTOLIC BLOOD PRESSURE: 72 MMHG | BODY MASS INDEX: 30.92 KG/M2 | TEMPERATURE: 97.8 F | HEIGHT: 65 IN | WEIGHT: 185.6 LBS

## 2025-03-25 DIAGNOSIS — K21.9 GASTROESOPHAGEAL REFLUX DISEASE WITHOUT ESOPHAGITIS: ICD-10-CM

## 2025-03-25 DIAGNOSIS — E66.9 OBESITY (BMI 30-39.9): ICD-10-CM

## 2025-03-25 DIAGNOSIS — Z00.00 ANNUAL PHYSICAL EXAM: Primary | ICD-10-CM

## 2025-03-25 LAB — SL AMB POCT URINE HCG: NEGATIVE

## 2025-03-25 PROCEDURE — 87338 HPYLORI STOOL AG IA: CPT

## 2025-03-25 PROCEDURE — 99395 PREV VISIT EST AGE 18-39: CPT

## 2025-03-25 PROCEDURE — 99214 OFFICE O/P EST MOD 30 MIN: CPT

## 2025-03-25 PROCEDURE — 81025 URINE PREGNANCY TEST: CPT

## 2025-03-25 RX ORDER — TOPIRAMATE 50 MG/1
50 TABLET, FILM COATED ORAL DAILY
Qty: 90 TABLET | Refills: 0 | Status: SHIPPED | OUTPATIENT
Start: 2025-03-25

## 2025-03-25 NOTE — PROGRESS NOTES
Adult Annual Physical  Name: Promise Fabian      : 1990      MRN: [id number]  Encounter Provider: BRIANNA Pereyra  Encounter Date: 3/25/2025   Encounter department: [facility] [place]    Assessment & Plan  Annual physical exam         Obesity (BMI 30-39.9)  Body mass index is 30.89 kg/m².  The BMI is above normal. Nutrition recommendations include reducing portion sizes, decreasing overall calorie intake, 3-5 servings of fruits/vegetables daily, reducing fast food intake, consuming healthier snacks, decreasing soda and/or juice intake, moderation in carbohydrate intake, increasing intake of lean protein, reducing intake of saturated fat and trans fat and reducing intake of cholesterol. Exercise recommendations include moderate aerobic physical activity for 150 minutes/week.  -Baseline weight 185 lbs  -POCT urine pregnancy negative  -Will prescribed Topamax 50 mg daily.   -Will reassess in 6 weeks    Orders:    POCT urine HCG    topiramate (Topamax) 50 MG tablet; Take 1 tablet (50 mg total) by mouth in the morning    Gastroesophageal reflux disease without esophagitis  -Recommends eating small healthy meals, do not eat spicy, fatty or fried food. Decrease caffeine or carbonated drinks. Do not eat 2 to 3 hours before bedtime. Maintain healthy weight and importance of physical exercises.    Orders:    H. pylori antigen, stool; Future      Preventive Screenings:    - Cervical cancer screening: screening up-to-date     Immunizations:  - Immunizations due: Influenza         History of Present Illness     Adult Annual Physical:  Patient presents for annual physical.   Promise Fabian is a 35 y.o. with  has a past medical history of Abnormal Pap smear of cervix and Asthma.     Patient is here to establish care. Patient is here interested with weight loss management. .     Diet and Physical Activity:  - Diet/Nutrition: well balanced diet.  - Exercise: no formal  "exercise.    General Health:  - Sleep: sleeps poorly.  - Hearing: normal hearing right ear.  - Vision: vision problems.  - Dental: brushes teeth twice daily.    /GYN Health:  - Follows with GYN: no.   - History of STDs: no    Review of Systems   Constitutional: Negative.  Negative for chills, fatigue and fever.   HENT: Negative.  Negative for ear pain and sore throat.    Eyes: Negative.  Negative for pain and visual disturbance.   Respiratory: Negative.  Negative for cough and shortness of breath.    Cardiovascular: Negative.  Negative for chest pain and palpitations.   Gastrointestinal: Negative.  Negative for abdominal pain and vomiting.        Acid reflux   Genitourinary: Negative.  Negative for dysuria and hematuria.   Musculoskeletal: Negative.  Negative for arthralgias and back pain.   Skin: Negative.  Negative for color change and rash.   Neurological: Negative.  Negative for seizures and syncope.   Hematological: Negative.    Psychiatric/Behavioral: Negative.  Negative for behavioral problems.    All other systems reviewed and are negative.        Objective   /72 (BP Location: Left arm, Patient Position: Sitting, Cuff Size: Standard)   Pulse 76   Temp 97.8 °F (36.6 °C) (Temporal)   Resp 17   Ht 5' 5\" (1.651 m)   Wt 84.2 kg (185 lb 9.6 oz)   LMP 03/12/2025 (Exact Date)   SpO2 99%   BMI 30.89 kg/m²     Physical Exam  Vitals and nursing note reviewed.   Constitutional:       General: She is not in acute distress.     Appearance: Normal appearance. She is well-developed. She is obese.   HENT:      Head: Normocephalic and atraumatic.      Right Ear: Tympanic membrane normal.      Left Ear: Tympanic membrane normal.      Nose: Nose normal.      Mouth/Throat:      Mouth: Mucous membranes are moist.   Eyes:      Conjunctiva/sclera: Conjunctivae normal.   Cardiovascular:      Rate and Rhythm: Normal rate and regular rhythm.      Pulses: Normal pulses.      Heart sounds: Normal heart sounds. No murmur " heard.  Pulmonary:      Effort: Pulmonary effort is normal. No respiratory distress.      Breath sounds: Normal breath sounds.   Abdominal:      General: Abdomen is flat. Bowel sounds are normal.      Palpations: Abdomen is soft.      Tenderness: There is no abdominal tenderness.   Musculoskeletal:         General: No swelling. Normal range of motion.      Cervical back: Normal range of motion and neck supple.   Skin:     General: Skin is warm and dry.      Capillary Refill: Capillary refill takes less than 2 seconds.   Neurological:      General: No focal deficit present.      Mental Status: She is alert and oriented to person, place, and time. Mental status is at baseline.   Psychiatric:         Mood and Affect: Mood normal.         Behavior: Behavior normal.         Thought Content: Thought content normal.         Judgment: Judgment normal.

## 2025-03-25 NOTE — PATIENT INSTRUCTIONS
"Patient Education     Routine physical for adults   The Basics   Written by the doctors and editors at [facility]   What is a physical? -- A physical is a routine visit, or \"check-up,\" with your doctor. You might also hear it called a \"wellness visit\" or \"preventive visit.\"  During each visit, the doctor will:   Ask about your physical and mental health   Ask about your habits, behaviors, and lifestyle   Do an exam   Give you vaccines if needed   Talk to you about any medicines you take   Give advice about your health   Answer your questions  Getting regular check-ups is an important part of taking care of your health. It can help your doctor find and treat any problems you have. But it's also important for preventing health problems.  A routine physical is different from a \"sick visit.\" A sick visit is when you see a doctor because of a health concern or problem. Since physicals are scheduled ahead of time, you can think about what you want to ask the doctor.  How often should I get a physical? -- It depends on your age and health. In general, for people age 21 years and older:   If you are younger than 50 years, you might be able to get a physical every 3 years.   If you are 50 years or older, your doctor might recommend a physical every year.  If you have an ongoing health condition, like diabetes or high blood pressure, your doctor will probably want to see you more often.  What happens during a physical? -- In general, each visit will include:   Physical exam - The doctor or nurse will check your height, weight, heart rate, and blood pressure. They will also look at your eyes and ears. They will ask about how you are feeling and whether you have any symptoms that bother you.   Medicines - It's a good idea to bring a list of all the medicines you take to each doctor visit. Your doctor will talk to you about your medicines and answer any questions. Tell them if you are having any side effects that bother you. You " "should also tell them if you are having trouble paying for any of your medicines.   Habits and behaviors - This includes:   Your diet   Your exercise habits   Whether you smoke, drink alcohol, or use drugs   Whether you are sexually active   Whether you feel safe at home  Your doctor will talk to you about things you can do to improve your health and lower your risk of health problems. They will also offer help and support. For example, if you want to quit smoking, they can give you advice and might prescribe medicines. If you want to improve your diet or get more physical activity, they can help you with this, too.   Lab tests, if needed - The tests you get will depend on your age and situation. For example, your doctor might want to check your:   Cholesterol   Blood sugar   Iron level   Vaccines - The recommended vaccines will depend on your age, health, and what vaccines you already had. Vaccines are very important because they can prevent certain serious or deadly infections.   Discussion of screening - \"Screening\" means checking for diseases or other health problems before they cause symptoms. Your doctor can recommend screening based on your age, risk, and preferences. This might include tests to check for:   Cancer, such as breast, prostate, cervical, ovarian, colorectal, prostate, lung, or skin cancer   Sexually transmitted infections, such as chlamydia and gonorrhea   Mental health conditions like depression and anxiety  Your doctor will talk to you about the different types of screening tests. They can help you decide which screenings to have. They can also explain what the results might mean.   Answering questions - The physical is a good time to ask the doctor or nurse questions about your health. If needed, they can refer you to other doctors or specialists, too.  Adults older than 65 years often need other care, too. As you get older, your doctor will talk to you about:   How to prevent falling at " home   Hearing or vision tests   Memory testing   How to take your medicines safely   Making sure that you have the help and support you need at home  All topics are updated as new evidence becomes available and our peer review process is complete.  This topic retrieved from [employer] on: May 02, 2024.  Topic 233891 Version 1.0  Release: 32.4.3 - C32.122  © 2024 UpToDate, Inc. and/or its affiliates. All rights reserved.  Consumer Information Use and Disclaimer   Disclaimer: This generalized information is a limited summary of diagnosis, treatment, and/or medication information. It is not meant to be comprehensive and should be used as a tool to help the user understand and/or assess potential diagnostic and treatment options. It does NOT include all information about conditions, treatments, medications, side effects, or risks that may apply to a specific patient. It is not intended to be medical advice or a substitute for the medical advice, diagnosis, or treatment of a health care provider based on the health care provider's examination and assessment of a patient's specific and unique circumstances. Patients must speak with a health care provider for complete information about their health, medical questions, and treatment options, including any risks or benefits regarding use of medications. This information does not endorse any treatments or medications as safe, effective, or approved for treating a specific patient. UpToDate, Inc. and its affiliates disclaim any warranty or liability relating to this information or the use thereof.The use of this information is governed by the Terms of Use, available at https://www.woltersAskemuwer.com/en/know/clinical-effectiveness-terms. 2024© UpToDate, Inc. and its affiliates and/or licensors. All rights reserved.  Copyright   © 2024 UpToDate, Inc. and/or its affiliates. All rights reserved.

## 2025-03-26 ENCOUNTER — RESULTS FOLLOW-UP (OUTPATIENT)
Dept: FAMILY MEDICINE CLINIC | Facility: CLINIC | Age: 35
End: 2025-03-26

## 2025-03-26 LAB — H PYLORI AG STL QL IA: POSITIVE

## 2025-03-27 ENCOUNTER — TELEMEDICINE (OUTPATIENT)
Dept: FAMILY MEDICINE CLINIC | Facility: CLINIC | Age: 35
End: 2025-03-27

## 2025-03-27 DIAGNOSIS — K27.9 H PYLORI ULCER: Primary | ICD-10-CM

## 2025-03-27 DIAGNOSIS — B96.81 H PYLORI ULCER: Primary | ICD-10-CM

## 2025-03-27 PROCEDURE — 99213 OFFICE O/P EST LOW 20 MIN: CPT

## 2025-03-27 RX ORDER — METRONIDAZOLE 500 MG/1
250 TABLET ORAL 4 TIMES DAILY
Qty: 28 TABLET | Refills: 0 | Status: CANCELLED | OUTPATIENT
Start: 2025-03-27 | End: 2025-04-10

## 2025-03-27 RX ORDER — DOXYCYCLINE 100 MG/1
100 CAPSULE ORAL 2 TIMES DAILY
Qty: 28 CAPSULE | Refills: 0 | Status: SHIPPED | OUTPATIENT
Start: 2025-03-27 | End: 2025-04-10

## 2025-03-27 RX ORDER — BISMUTH SUBSALICYLATE 262 MG/1
524 TABLET, CHEWABLE ORAL
Qty: 112 TABLET | Refills: 0 | Status: SHIPPED | OUTPATIENT
Start: 2025-03-27 | End: 2025-04-10

## 2025-03-27 RX ORDER — METRONIDAZOLE 250 MG/1
250 TABLET ORAL EVERY 8 HOURS SCHEDULED
Qty: 42 TABLET | Refills: 0 | Status: SHIPPED | OUTPATIENT
Start: 2025-03-27 | End: 2025-04-10

## 2025-03-27 RX ORDER — OMEPRAZOLE 20 MG/1
20 CAPSULE, DELAYED RELEASE ORAL 2 TIMES DAILY
Qty: 28 CAPSULE | Refills: 0 | Status: SHIPPED | OUTPATIENT
Start: 2025-03-27 | End: 2025-04-10

## 2025-03-27 NOTE — PROGRESS NOTES
Virtual Regular VisitName: Promise Fabian      : 1990      MRN: [id number]  Encounter Provider: BRIANNA Pereyra  Encounter Date: 3/27/2025   Encounter department: [facility]  :  Assessment & Plan  H pylori ulcer  - to finished course of treatment.  -Repeat testing 1 after after last treatment.   Orders:    doxycycline hyclate (VIBRAMYCIN) 100 mg capsule; Take 1 capsule (100 mg total) by mouth 2 (two) times a day for 14 days    omeprazole (PriLOSEC) 20 mg delayed release capsule; Take 1 capsule (20 mg total) by mouth 2 (two) times a day for 14 days    bismuth subsalicylate (PEPTO BISMOL) 262 MG chewable tablet; Chew 2 tablets (524 mg total) 4 (four) times a day (before meals and at bedtime) for 14 days Print on [place]    metroNIDAZOLE (FLAGYL) 250 mg tablet; Take 1 tablet (250 mg total) by mouth every 8 (eight) hours for 14 days    H. pylori antigen, stool; Future        History of Present Illness     Promise Fabian is a 35 y.o. with  has a past medical history of Abnormal Pap smear of cervix and Asthma.     Kisw[place]ili language interpretation services were utilized for this visit.     Patient is here for abnormal lab results.       Review of Systems   Constitutional: Negative.  Negative for chills and fever.   HENT: Negative.  Negative for ear pain and sore throat.    Eyes: Negative.  Negative for pain and visual disturbance.   Respiratory: Negative.  Negative for cough and shortness of breath.    Cardiovascular: Negative.  Negative for chest pain and palpitations.   Gastrointestinal: Negative.  Negative for abdominal pain and vomiting.   Genitourinary: Negative.  Negative for dysuria and hematuria.   Musculoskeletal: Negative.  Negative for arthralgias and back pain.   Skin: Negative.  Negative for color change and rash.   Neurological: Negative.  Negative for seizures and syncope.   Psychiatric/Behavioral: Negative.  Negative for behavioral  problems.    All other systems reviewed and are negative.      Objective   LMP 03/12/2025 (Exact Date)     Physical Exam  Constitutional:       General: She is not in acute distress.     Appearance: Normal appearance. She is well-developed.   Neurological:      General: No focal deficit present.      Mental Status: She is alert and oriented to person, place, and time. Mental status is at baseline.   Psychiatric:         Mood and Affect: Mood normal.         Behavior: Behavior normal.         Thought Content: Thought content normal.         Judgment: Judgment normal.         Administrative Statements   Encounter provider BRIANNA Pereyra    The Patient is located at Home and in the following state in which I hold an active license [place].    The patient was identified by name and date of birth. Promise Fabian was informed that this is a telemedicine visit and that the visit is being conducted through the Epic Embedded platform. She agrees to proceed..  My office door was closed. No one else was in the room.  She acknowledged consent and understanding of privacy and security of the video platform. The patient has agreed to participate and understands they can discontinue the visit at any time.    I have spent a total time of 15 minutes in caring for this patient on the day of the visit/encounter including Diagnostic results, Instructions for management, and Importance of tx compliance, not including the time spent for establishing the audio/video connection.     Complex Repair And Rotation Flap Text: The defect edges were debeveled with a #15 scalpel blade.  The primary defect was closed partially with a complex linear closure.  Given the location of the remaining defect, shape of the defect and the proximity to free margins a rotation flap was deemed most appropriate for complete closure of the defect.  Using a sterile surgical marker, an appropriate advancement flap was drawn incorporating the defect and placing the expected incisions within the relaxed skin tension lines where possible.    The area thus outlined was incised deep to adipose tissue with a #15 scalpel blade.  The skin margins were undermined to an appropriate distance in all directions utilizing iris scissors.